# Patient Record
Sex: FEMALE | Race: WHITE | NOT HISPANIC OR LATINO | Employment: OTHER | URBAN - METROPOLITAN AREA
[De-identification: names, ages, dates, MRNs, and addresses within clinical notes are randomized per-mention and may not be internally consistent; named-entity substitution may affect disease eponyms.]

---

## 2017-06-22 ENCOUNTER — HOSPITAL ENCOUNTER (INPATIENT)
Facility: HOSPITAL | Age: 52
LOS: 1 days | Discharge: HOME | DRG: 872 | End: 2017-06-23
Attending: EMERGENCY MEDICINE | Admitting: INTERNAL MEDICINE
Payer: MEDICAID

## 2017-06-22 DIAGNOSIS — R79.89 ELEVATED TROPONIN: ICD-10-CM

## 2017-06-22 DIAGNOSIS — F19.10 SUBSTANCE ABUSE: ICD-10-CM

## 2017-06-22 DIAGNOSIS — D72.829 LEUKOCYTOSIS, UNSPECIFIED TYPE: ICD-10-CM

## 2017-06-22 DIAGNOSIS — N39.0 URINARY TRACT INFECTION WITHOUT HEMATURIA, SITE UNSPECIFIED: ICD-10-CM

## 2017-06-22 DIAGNOSIS — A41.9 SEPSIS, DUE TO UNSPECIFIED ORGANISM: Primary | ICD-10-CM

## 2017-06-22 DIAGNOSIS — E86.0 DEHYDRATION: ICD-10-CM

## 2017-06-22 DIAGNOSIS — Z72.0 TOBACCO ABUSE: ICD-10-CM

## 2017-06-22 PROBLEM — E86.1 INTRAVASCULAR VOLUME DEPLETION: Status: ACTIVE | Noted: 2017-06-22

## 2017-06-22 PROBLEM — J45.909 ASTHMA: Chronic | Status: ACTIVE | Noted: 2017-06-22

## 2017-06-22 PROBLEM — R65.10 SIRS (SYSTEMIC INFLAMMATORY RESPONSE SYNDROME): Status: ACTIVE | Noted: 2017-06-22

## 2017-06-22 PROBLEM — I95.9 HYPOTENSION: Status: ACTIVE | Noted: 2017-06-22

## 2017-06-22 PROBLEM — B19.20 HEPATITIS C: Status: ACTIVE | Noted: 2017-06-22

## 2017-06-22 PROBLEM — D64.9 NORMOCYTIC ANEMIA: Status: ACTIVE | Noted: 2017-06-22

## 2017-06-22 PROBLEM — E89.0 POSTOPERATIVE HYPOTHYROIDISM: Chronic | Status: ACTIVE | Noted: 2017-06-22

## 2017-06-22 LAB
ALBUMIN SERPL BCP-MCNC: 2.4 G/DL
ALP SERPL-CCNC: 63 U/L
ALT SERPL W/O P-5'-P-CCNC: 17 U/L
AMPHET+METHAMPHET UR QL: NEGATIVE
ANION GAP SERPL CALC-SCNC: 12 MMOL/L
AST SERPL-CCNC: 18 U/L
BARBITURATES UR QL SCN>200 NG/ML: NEGATIVE
BASOPHILS # BLD AUTO: 0.01 K/UL
BASOPHILS NFR BLD: 0 %
BENZODIAZ UR QL SCN>200 NG/ML: NORMAL
BILIRUB SERPL-MCNC: 0.5 MG/DL
BILIRUB UR QL STRIP: NEGATIVE
BNP SERPL-MCNC: 58 PG/ML
BUN SERPL-MCNC: 14 MG/DL
BZE UR QL SCN: NEGATIVE
CALCIUM SERPL-MCNC: 7.6 MG/DL
CANNABINOIDS UR QL SCN: NORMAL
CHLORIDE SERPL-SCNC: 98 MMOL/L
CK SERPL-CCNC: 27 U/L
CLARITY UR: CLEAR
CO2 SERPL-SCNC: 27 MMOL/L
COLOR UR: YELLOW
CREAT SERPL-MCNC: 1.4 MG/DL
CREAT UR-MCNC: 22.6 MG/DL
DIFFERENTIAL METHOD: ABNORMAL
EOSINOPHIL # BLD AUTO: 0.1 K/UL
EOSINOPHIL NFR BLD: 0.3 %
ERYTHROCYTE [DISTWIDTH] IN BLOOD BY AUTOMATED COUNT: 14.7 %
EST. GFR  (AFRICAN AMERICAN): 49 ML/MIN/1.73 M^2
EST. GFR  (NON AFRICAN AMERICAN): 43 ML/MIN/1.73 M^2
FLUAV AG SPEC QL IA: NEGATIVE
FLUBV AG SPEC QL IA: NEGATIVE
GLUCOSE SERPL-MCNC: 95 MG/DL
GLUCOSE UR QL STRIP: NEGATIVE
HCT VFR BLD AUTO: 30.8 %
HGB BLD-MCNC: 10.6 G/DL
HGB UR QL STRIP: ABNORMAL
KETONES UR QL STRIP: NEGATIVE
LACTATE SERPL-SCNC: 0.8 MMOL/L
LEUKOCYTE ESTERASE UR QL STRIP: ABNORMAL
LIPASE SERPL-CCNC: <3 U/L
LYMPHOCYTES # BLD AUTO: 1.4 K/UL
LYMPHOCYTES NFR BLD: 6.8 %
MCH RBC QN AUTO: 30 PG
MCHC RBC AUTO-ENTMCNC: 34.4 %
MCV RBC AUTO: 87 FL
METHADONE UR QL SCN>300 NG/ML: NEGATIVE
MICROSCOPIC COMMENT: ABNORMAL
MONOCYTES # BLD AUTO: 1.1 K/UL
MONOCYTES NFR BLD: 5.2 %
NEUTROPHILS # BLD AUTO: 18.4 K/UL
NEUTROPHILS NFR BLD: 87.7 %
NITRITE UR QL STRIP: NEGATIVE
OPIATES UR QL SCN: NORMAL
PCP UR QL SCN>25 NG/ML: NEGATIVE
PH UR STRIP: 8 [PH] (ref 5–8)
PLATELET # BLD AUTO: 314 K/UL
PMV BLD AUTO: 8.8 FL
POTASSIUM SERPL-SCNC: 3.9 MMOL/L
PROT SERPL-MCNC: 6.3 G/DL
PROT UR QL STRIP: NEGATIVE
RBC # BLD AUTO: 3.53 M/UL
RBC #/AREA URNS HPF: 1 /HPF (ref 0–4)
SODIUM SERPL-SCNC: 137 MMOL/L
SP GR UR STRIP: 1.01 (ref 1–1.03)
SPECIMEN SOURCE: NORMAL
SQUAMOUS #/AREA URNS HPF: 1 /HPF
TOXICOLOGY INFORMATION: NORMAL
TROPONIN I SERPL DL<=0.01 NG/ML-MCNC: 0.03 NG/ML
TSH SERPL DL<=0.005 MIU/L-ACNC: 2.61 UIU/ML
URN SPEC COLLECT METH UR: ABNORMAL
UROBILINOGEN UR STRIP-ACNC: NEGATIVE EU/DL
VALPROATE SERPL-MCNC: <12.5 UG/ML
WBC # BLD AUTO: 21.04 K/UL
WBC #/AREA URNS HPF: 12 /HPF (ref 0–5)
WBC CLUMPS URNS QL MICRO: ABNORMAL

## 2017-06-22 PROCEDURE — 21400001 HC TELEMETRY ROOM

## 2017-06-22 PROCEDURE — 93010 ELECTROCARDIOGRAM REPORT: CPT | Mod: ,,, | Performed by: INTERNAL MEDICINE

## 2017-06-22 PROCEDURE — 81000 URINALYSIS NONAUTO W/SCOPE: CPT

## 2017-06-22 PROCEDURE — 87088 URINE BACTERIA CULTURE: CPT

## 2017-06-22 PROCEDURE — 84443 ASSAY THYROID STIM HORMONE: CPT

## 2017-06-22 PROCEDURE — 99285 EMERGENCY DEPT VISIT HI MDM: CPT | Mod: 25

## 2017-06-22 PROCEDURE — 96365 THER/PROPH/DIAG IV INF INIT: CPT

## 2017-06-22 PROCEDURE — 87077 CULTURE AEROBIC IDENTIFY: CPT | Mod: 59

## 2017-06-22 PROCEDURE — 82550 ASSAY OF CK (CPK): CPT

## 2017-06-22 PROCEDURE — 84484 ASSAY OF TROPONIN QUANT: CPT

## 2017-06-22 PROCEDURE — 80053 COMPREHEN METABOLIC PANEL: CPT

## 2017-06-22 PROCEDURE — 83880 ASSAY OF NATRIURETIC PEPTIDE: CPT

## 2017-06-22 PROCEDURE — 87040 BLOOD CULTURE FOR BACTERIA: CPT | Mod: 59

## 2017-06-22 PROCEDURE — 63600175 PHARM REV CODE 636 W HCPCS: Performed by: EMERGENCY MEDICINE

## 2017-06-22 PROCEDURE — 63600175 PHARM REV CODE 636 W HCPCS: Performed by: NURSE PRACTITIONER

## 2017-06-22 PROCEDURE — 25000003 PHARM REV CODE 250: Performed by: EMERGENCY MEDICINE

## 2017-06-22 PROCEDURE — 87186 SC STD MICRODIL/AGAR DIL: CPT

## 2017-06-22 PROCEDURE — 85025 COMPLETE CBC W/AUTO DIFF WBC: CPT

## 2017-06-22 PROCEDURE — 80307 DRUG TEST PRSMV CHEM ANLYZR: CPT

## 2017-06-22 PROCEDURE — 87400 INFLUENZA A/B EACH AG IA: CPT | Mod: 59

## 2017-06-22 PROCEDURE — 83690 ASSAY OF LIPASE: CPT

## 2017-06-22 PROCEDURE — 83605 ASSAY OF LACTIC ACID: CPT

## 2017-06-22 PROCEDURE — 94640 AIRWAY INHALATION TREATMENT: CPT

## 2017-06-22 PROCEDURE — 96361 HYDRATE IV INFUSION ADD-ON: CPT

## 2017-06-22 PROCEDURE — 80164 ASSAY DIPROPYLACETIC ACD TOT: CPT

## 2017-06-22 PROCEDURE — 36415 COLL VENOUS BLD VENIPUNCTURE: CPT

## 2017-06-22 PROCEDURE — 87086 URINE CULTURE/COLONY COUNT: CPT

## 2017-06-22 PROCEDURE — 25000242 PHARM REV CODE 250 ALT 637 W/ HCPCS: Performed by: NURSE PRACTITIONER

## 2017-06-22 PROCEDURE — 93005 ELECTROCARDIOGRAM TRACING: CPT

## 2017-06-22 PROCEDURE — 25000003 PHARM REV CODE 250: Performed by: NURSE PRACTITIONER

## 2017-06-22 PROCEDURE — 63600175 PHARM REV CODE 636 W HCPCS: Performed by: INTERNAL MEDICINE

## 2017-06-22 PROCEDURE — 25000003 PHARM REV CODE 250: Performed by: INTERNAL MEDICINE

## 2017-06-22 PROCEDURE — 11000001 HC ACUTE MED/SURG PRIVATE ROOM

## 2017-06-22 RX ORDER — CALCIUM CARBONATE 600 MG
600 TABLET ORAL 2 TIMES DAILY WITH MEALS
COMMUNITY

## 2017-06-22 RX ORDER — DIAZEPAM 10 MG/1
10 TABLET ORAL EVERY 12 HOURS PRN
COMMUNITY
Start: 2014-04-04

## 2017-06-22 RX ORDER — CYCLOBENZAPRINE HCL 10 MG
10 TABLET ORAL 3 TIMES DAILY PRN
COMMUNITY

## 2017-06-22 RX ORDER — LEVOTHYROXINE SODIUM 112 UG/1
112 TABLET ORAL
Status: DISCONTINUED | OUTPATIENT
Start: 2017-06-23 | End: 2017-06-23 | Stop reason: HOSPADM

## 2017-06-22 RX ORDER — ALBUTEROL SULFATE 2.5 MG/.5ML
2.5 SOLUTION RESPIRATORY (INHALATION) EVERY 4 HOURS PRN
Status: DISCONTINUED | OUTPATIENT
Start: 2017-06-22 | End: 2017-06-23 | Stop reason: HOSPADM

## 2017-06-22 RX ORDER — FLUTICASONE FUROATE AND VILANTEROL 100; 25 UG/1; UG/1
1 POWDER RESPIRATORY (INHALATION) DAILY
COMMUNITY

## 2017-06-22 RX ORDER — DIVALPROEX SODIUM 500 MG/1
TABLET, DELAYED RELEASE ORAL
COMMUNITY
Start: 2014-10-07

## 2017-06-22 RX ORDER — CALCIUM CARBONATE 500(1250)
500 TABLET ORAL 2 TIMES DAILY WITH MEALS
Status: DISCONTINUED | OUTPATIENT
Start: 2017-06-22 | End: 2017-06-23 | Stop reason: HOSPADM

## 2017-06-22 RX ORDER — FAMOTIDINE 20 MG/1
20 TABLET, FILM COATED ORAL DAILY
Status: DISCONTINUED | OUTPATIENT
Start: 2017-06-22 | End: 2017-06-23 | Stop reason: HOSPADM

## 2017-06-22 RX ORDER — OMEPRAZOLE 40 MG/1
40 CAPSULE, DELAYED RELEASE ORAL DAILY
COMMUNITY

## 2017-06-22 RX ORDER — SODIUM CHLORIDE 9 MG/ML
INJECTION, SOLUTION INTRAVENOUS CONTINUOUS
Status: DISCONTINUED | OUTPATIENT
Start: 2017-06-22 | End: 2017-06-23 | Stop reason: HOSPADM

## 2017-06-22 RX ORDER — DIVALPROEX SODIUM 500 MG/1
500 TABLET, DELAYED RELEASE ORAL 2 TIMES DAILY
Status: DISCONTINUED | OUTPATIENT
Start: 2017-06-22 | End: 2017-06-23 | Stop reason: HOSPADM

## 2017-06-22 RX ORDER — ENOXAPARIN SODIUM 100 MG/ML
40 INJECTION SUBCUTANEOUS EVERY 24 HOURS
Status: DISCONTINUED | OUTPATIENT
Start: 2017-06-22 | End: 2017-06-23 | Stop reason: HOSPADM

## 2017-06-22 RX ORDER — LISINOPRIL 10 MG/1
10 TABLET ORAL DAILY
COMMUNITY

## 2017-06-22 RX ORDER — MELOXICAM 15 MG/1
15 TABLET ORAL DAILY
COMMUNITY

## 2017-06-22 RX ORDER — LEVOTHYROXINE SODIUM 112 UG/1
TABLET ORAL
COMMUNITY
Start: 2014-04-04

## 2017-06-22 RX ORDER — ACETAMINOPHEN 325 MG/1
650 TABLET ORAL EVERY 8 HOURS PRN
Status: DISCONTINUED | OUTPATIENT
Start: 2017-06-22 | End: 2017-06-23

## 2017-06-22 RX ORDER — MULTIVITAMIN
1 TABLET ORAL
COMMUNITY

## 2017-06-22 RX ORDER — FLUTICASONE PROPIONATE 50 MCG
1 SPRAY, SUSPENSION (ML) NASAL DAILY
COMMUNITY

## 2017-06-22 RX ORDER — SUMATRIPTAN SUCCINATE 100 MG/1
100 TABLET ORAL
COMMUNITY
End: 2021-04-15

## 2017-06-22 RX ORDER — IPRATROPIUM BROMIDE AND ALBUTEROL SULFATE 2.5; .5 MG/3ML; MG/3ML
3 SOLUTION RESPIRATORY (INHALATION) EVERY 6 HOURS
Status: DISCONTINUED | OUTPATIENT
Start: 2017-06-22 | End: 2017-06-23 | Stop reason: HOSPADM

## 2017-06-22 RX ORDER — FERROUS SULFATE, DRIED 160(50) MG
1 TABLET, EXTENDED RELEASE ORAL
COMMUNITY
Start: 2013-02-19

## 2017-06-22 RX ORDER — BUDESONIDE 0.5 MG/2ML
0.5 INHALANT ORAL EVERY 12 HOURS
Status: DISCONTINUED | OUTPATIENT
Start: 2017-06-22 | End: 2017-06-23 | Stop reason: HOSPADM

## 2017-06-22 RX ORDER — ALBUTEROL SULFATE 90 UG/1
1 AEROSOL, METERED RESPIRATORY (INHALATION) EVERY 4 HOURS PRN
Status: DISCONTINUED | OUTPATIENT
Start: 2017-06-22 | End: 2017-06-22

## 2017-06-22 RX ORDER — ARFORMOTEROL TARTRATE 15 UG/2ML
15 SOLUTION RESPIRATORY (INHALATION) 2 TIMES DAILY
Status: DISCONTINUED | OUTPATIENT
Start: 2017-06-22 | End: 2017-06-23 | Stop reason: HOSPADM

## 2017-06-22 RX ORDER — MAGNESIUM 250 MG
TABLET ORAL DAILY
COMMUNITY

## 2017-06-22 RX ORDER — FLUTICASONE PROPIONATE AND SALMETEROL 100; 50 UG/1; UG/1
1 POWDER RESPIRATORY (INHALATION) 2 TIMES DAILY
Status: DISCONTINUED | OUTPATIENT
Start: 2017-06-22 | End: 2017-06-22 | Stop reason: CLARIF

## 2017-06-22 RX ADMIN — ARFORMOTEROL TARTRATE 15 MCG: 15 SOLUTION RESPIRATORY (INHALATION) at 07:06

## 2017-06-22 RX ADMIN — DIVALPROEX SODIUM 500 MG: 500 TABLET, DELAYED RELEASE ORAL at 10:06

## 2017-06-22 RX ADMIN — SODIUM CHLORIDE: 0.9 INJECTION, SOLUTION INTRAVENOUS at 02:06

## 2017-06-22 RX ADMIN — ACETAMINOPHEN 650 MG: 325 TABLET ORAL at 10:06

## 2017-06-22 RX ADMIN — SODIUM CHLORIDE 2000 ML: 0.9 INJECTION, SOLUTION INTRAVENOUS at 11:06

## 2017-06-22 RX ADMIN — BUDESONIDE 0.5 MG: 0.5 SUSPENSION RESPIRATORY (INHALATION) at 07:06

## 2017-06-22 RX ADMIN — CEFTRIAXONE 1 G: 1 INJECTION, SOLUTION INTRAVENOUS at 01:06

## 2017-06-22 RX ADMIN — CALCIUM 500 MG: 500 TABLET ORAL at 06:06

## 2017-06-22 RX ADMIN — IPRATROPIUM BROMIDE AND ALBUTEROL SULFATE 3 ML: .5; 3 SOLUTION RESPIRATORY (INHALATION) at 07:06

## 2017-06-22 RX ADMIN — ENOXAPARIN SODIUM 40 MG: 100 INJECTION SUBCUTANEOUS at 06:06

## 2017-06-22 RX ADMIN — FAMOTIDINE 20 MG: 20 TABLET ORAL at 06:06

## 2017-06-22 NOTE — ASSESSMENT & PLAN NOTE
WBC 21, GFR 43, SBP < 90.  Recent steroid use likely contributing to elevated WBC count.  Blood and urine cultures pending.  Will start Rocephin for now and follow cultures.

## 2017-06-22 NOTE — ASSESSMENT & PLAN NOTE
BP responded well with IV bolus in ED.  Will hold home antihypertensives for now.  Continuous IV fluids.

## 2017-06-22 NOTE — PLAN OF CARE
Problem: Patient Care Overview  Goal: Plan of Care Review  AAO, ambulatory.  Free from falls.  Reviewed plan of care with patient.  Patient verbalized understanding and teachback.      12h chart check completed.

## 2017-06-22 NOTE — ED PROVIDER NOTES
"SCRIBE #1 NOTE: I, Rosalio Juan, am scribing for, and in the presence of, Jose Francisco Harrison MD. I have scribed the entire note.     SCRIBE #2 NOTE: I, Kaur Avery MD and am scribing for, and in the presence of, Amado Juan.     History      Chief Complaint   Patient presents with    Generalized Body Aches     Pt states, "I have body aches for about 2 weeks and get really tired feeling and having fever."       Review of patient's allergies indicates:   Allergen Reactions    Benadryl [diphenhydramine hcl]     Penicillins         HPI   HPI    6/22/2017, 10:51 AM   History obtained from the patient and       History of Present Illness: Donna G Severio is a 51 y.o. female patient who presents to the Emergency Department for body aches which onset gradually 2 weeks ago. Sxs are constant and moderate in severity. Pt reports being Dx with flu at walk in clinic 5 days ago and was Rx cough medicine.  There are no mitigating or exacerbating factors noted. Associated sxs include episodic fatigue,  fever (Tmax 104).  Pt denies any HA, N/V/D, numbness, dizziness, LOC, CP, SOB, Abd pain, dysuria,  and all other sxs at this time. No further complaints or concerns at this time.     Arrival mode: Personal vehicle      PCP: Chuy Kirkland Jr, MD       Past Medical History:  Past Medical History:   Diagnosis Date    Asthma     Back pain     Hypertension     Thyroid disease     hyperthyroid       Past Surgical History:  Past Surgical History:   Procedure Laterality Date    HYSTERECTOMY      THYROIDECTOMY         Family History:  Family history reviewed not relevant      Social History:  Social History    Social History     Social History    Marital status: Single     Spouse name: N/A    Number of children: N/A    Years of education: N/A     Occupational History    Not on file.     Social History Main Topics    Smoking status: Current Every Day Smoker     Packs/day: 0.50     Years: 35.00     " Types: Cigarettes    Smokeless tobacco: Not on file    Alcohol use No    Drug use:      Types: Marijuana, Benzodiazepines    Sexual activity: Not on file     Other Topics Concern    Not on file     Social History Narrative    No narrative on file             ROS   Review of Systems   Constitutional: Positive for fatigue and fever.   HENT: Negative for sore throat.    Respiratory: Negative for shortness of breath.    Cardiovascular: Negative for chest pain.   Gastrointestinal: Negative for abdominal pain, constipation, diarrhea, nausea and vomiting.   Genitourinary: Negative for dysuria.   Musculoskeletal: Positive for arthralgias. Negative for back pain.   Skin: Negative for rash.   Neurological: Negative for syncope, weakness and headaches.   Hematological: Does not bruise/bleed easily.       Physical Exam      Initial Vitals [06/22/17 1044]   BP Pulse Resp Temp SpO2   (!) 88/54 (!) 112 20 99 °F (37.2 °C) 96 %      MAP       65.33          Physical Exam  Nursing Notes and Vital Signs Reviewed.  Constitutional: Patient is in no acute distress. Well-developed and well-nourished.  Head: Atraumatic. Normocephalic.  Eyes: PERRL. EOM intact. Conjunctivae are not pale. No scleral icterus.  ENT: Mucous membranes are moist. Oropharynx is clear and symmetric.    Neck: Supple. Full ROM. No lymphadenopathy.  Cardiovascular: Regular rate. Regular rhythm. No murmurs, rubs, or gallops. Distal pulses are 2+ and symmetric.  Pulmonary/Chest: No respiratory distress. Clear to auscultation bilaterally. No wheezing, rales, or rhonchi.  Abdominal: Soft and non-distended.  There is no tenderness.  No rebound, guarding, or rigidity. Good bowel sounds.  Genitourinary: No CVA tenderness  Musculoskeletal: Moves all extremities. No obvious deformities. No edema. No calf tenderness.  Skin: Warm and dry.  Neurological:  Alert, awake, and appropriate.  Normal speech.  No acute focal neurological deficits are appreciated.  Psychiatric:  "Normal affect. Good eye contact. Appropriate in content.    ED Course    Procedures  ED Vital Signs:  Vitals:    06/22/17 1044 06/22/17 1105 06/22/17 1107 06/22/17 1108   BP: (!) 88/54 (!) 85/53     Pulse: (!) 112 94 94 91   Resp: 20 (!) 21 17 15   Temp: 99 °F (37.2 °C)      TempSrc: Oral      SpO2: 96% 99% 98% 98%   Weight: 45.8 kg (101 lb)      Height: 5' 1.5" (1.562 m)       06/22/17 1119 06/22/17 1147 06/22/17 1217 06/22/17 1412   BP: (!) 101/58 (!) 98/59 110/64    Pulse: 85 73 75    Resp: (!) 21 17 16    Temp:    98.2 °F (36.8 °C)   TempSrc:    Oral   SpO2: 99% 100% 100%    Weight:       Height:        06/22/17 1438   BP: 119/70   Pulse: 65   Resp: 16   Temp:    TempSrc: Oral   SpO2: 100%   Weight: 45.8 kg (101 lb)   Height: 5' 1" (1.549 m)       Abnormal Lab Results:  Labs Reviewed   CBC W/ AUTO DIFFERENTIAL - Abnormal; Notable for the following:        Result Value    WBC 21.04 (*)     RBC 3.53 (*)     Hemoglobin 10.6 (*)     Hematocrit 30.8 (*)     RDW 14.7 (*)     MPV 8.8 (*)     Gran # 18.4 (*)     Mono # 1.1 (*)     Gran% 87.7 (*)     Lymph% 6.8 (*)     All other components within normal limits   COMPREHENSIVE METABOLIC PANEL - Abnormal; Notable for the following:     Calcium 7.6 (*)     Albumin 2.4 (*)     eGFR if  49 (*)     eGFR if non  43 (*)     All other components within normal limits   URINALYSIS - Abnormal; Notable for the following:     Occult Blood UA Trace (*)     Leukocytes, UA 1+ (*)     All other components within normal limits   TROPONIN I - Abnormal; Notable for the following:     Troponin I 0.027 (*)     All other components within normal limits   URINALYSIS MICROSCOPIC - Abnormal; Notable for the following:     WBC, UA 12 (*)     All other components within normal limits   VALPROIC ACID - Abnormal; Notable for the following:     Valproic Acid Lvl <12.5 (*)     All other components within normal limits   LIPASE - Abnormal; Notable for the following:     " Lipase <3 (*)     All other components within normal limits   CULTURE, URINE   CULTURE, BLOOD   CULTURE, BLOOD   CULTURE, URINE   INFLUENZA A AND B ANTIGEN   B-TYPE NATRIURETIC PEPTIDE   CK   LACTIC ACID, PLASMA   DRUG SCREEN PANEL, URINE EMERGENCY   DRUG SCREEN PANEL, URINE EMERGENCY   VALPROIC ACID   TSH   TSH   LIPASE        All Lab Results:  Results for orders placed or performed during the hospital encounter of 06/22/17   CBC auto differential   Result Value Ref Range    WBC 21.04 (H) 3.90 - 12.70 K/uL    RBC 3.53 (L) 4.00 - 5.40 M/uL    Hemoglobin 10.6 (L) 12.0 - 16.0 g/dL    Hematocrit 30.8 (L) 37.0 - 48.5 %    MCV 87 82 - 98 fL    MCH 30.0 27.0 - 31.0 pg    MCHC 34.4 32.0 - 36.0 %    RDW 14.7 (H) 11.5 - 14.5 %    Platelets 314 150 - 350 K/uL    MPV 8.8 (L) 9.2 - 12.9 fL    Gran # 18.4 (H) 1.8 - 7.7 K/uL    Lymph # 1.4 1.0 - 4.8 K/uL    Mono # 1.1 (H) 0.3 - 1.0 K/uL    Eos # 0.1 0.0 - 0.5 K/uL    Baso # 0.01 0.00 - 0.20 K/uL    Gran% 87.7 (H) 38.0 - 73.0 %    Lymph% 6.8 (L) 18.0 - 48.0 %    Mono% 5.2 4.0 - 15.0 %    Eosinophil% 0.3 0.0 - 8.0 %    Basophil% 0.0 0.0 - 1.9 %    Differential Method Automated    Comprehensive metabolic panel   Result Value Ref Range    Sodium 137 136 - 145 mmol/L    Potassium 3.9 3.5 - 5.1 mmol/L    Chloride 98 95 - 110 mmol/L    CO2 27 23 - 29 mmol/L    Glucose 95 70 - 110 mg/dL    BUN, Bld 14 6 - 20 mg/dL    Creatinine 1.4 0.5 - 1.4 mg/dL    Calcium 7.6 (L) 8.7 - 10.5 mg/dL    Total Protein 6.3 6.0 - 8.4 g/dL    Albumin 2.4 (L) 3.5 - 5.2 g/dL    Total Bilirubin 0.5 0.1 - 1.0 mg/dL    Alkaline Phosphatase 63 55 - 135 U/L    AST 18 10 - 40 U/L    ALT 17 10 - 44 U/L    Anion Gap 12 8 - 16 mmol/L    eGFR if African American 49 (A) >60 mL/min/1.73 m^2    eGFR if non African American 43 (A) >60 mL/min/1.73 m^2   Urinalysis   Result Value Ref Range    Specimen UA Urine, Clean Catch     Color, UA Yellow Yellow, Straw, Kate    Appearance, UA Clear Clear    pH, UA 8.0 5.0 - 8.0     Specific Gravity, UA 1.010 1.005 - 1.030    Protein, UA Negative Negative    Glucose, UA Negative Negative    Ketones, UA Negative Negative    Bilirubin (UA) Negative Negative    Occult Blood UA Trace (A) Negative    Nitrite, UA Negative Negative    Urobilinogen, UA Negative <2.0 EU/dL    Leukocytes, UA 1+ (A) Negative   Influenza antigen Nasopharyngeal Swab   Result Value Ref Range    Influenza A Ag, EIA Negative Negative    Influenza B Ag, EIA Negative Negative    Flu A & B Source Nasopharyngeal Swab    Brain natriuretic peptide   Result Value Ref Range    BNP 58 0 - 99 pg/mL   CK   Result Value Ref Range    CPK 27 20 - 180 U/L   Troponin I   Result Value Ref Range    Troponin I 0.027 (H) 0.000 - 0.026 ng/mL   Urinalysis Microscopic   Result Value Ref Range    RBC, UA 1 0 - 4 /hpf    WBC, UA 12 (H) 0 - 5 /hpf    WBC Clumps, UA Rare None-Rare    Squam Epithel, UA 1 /hpf    Microscopic Comment SEE COMMENT    Lactic acid, plasma   Result Value Ref Range    Lactate (Lactic Acid) 0.8 0.5 - 2.2 mmol/L   Drug screen panel, emergency   Result Value Ref Range    Benzodiazepines Presumptive Positive     Methadone metabolites Negative     Cocaine (Metab.) Negative     Opiate Scrn, Ur Presumptive Positive     Barbiturate Screen, Ur Negative     Amphetamine Screen, Ur Negative     THC Presumptive Positive     Phencyclidine Negative     Creatinine, Random Ur 22.6 15.0 - 325.0 mg/dL    Toxicology Information SEE COMMENT    TSH   Result Value Ref Range    TSH 2.610 0.400 - 4.000 uIU/mL   Valproic Acid   Result Value Ref Range    Valproic Acid Lvl <12.5 (L) 50.0 - 100.0 ug/mL   Lipase   Result Value Ref Range    Lipase <3 (L) 4 - 60 U/L         Imaging Results:  Imaging Results          US Abdomen Limited (Final result)  Result time 06/22/17 13:18:51    Final result by Valerio Saravia III, MD (06/22/17 13:18:51)                 Impression:          1.  Mild dilation proximal common bile duct without obstructing lesion  identified.  2.  Mildly complex 1.7 cm right renal cyst.  Otherwise unremarkable exam.         Electronically signed by: JOSE D SARAVIA MD  Date:     06/22/17  Time:    13:18              Narrative:    US ABDOMEN LIMITED    History: Right upper quadrant pain.    Findings: The liver measures 12.9 cm in length. The liver is normal in appearance without focal hepatic mass identified. The gallbladder is normal in appearance without evidence of cholelithiasis or acute cholecystitis. Sonographic Flores's sign is negative.  There is mild atrophy of the proximal common bile duct measuring up to 8.5 mm in diameter.  There is normal gradual tapering of the more distal common duct without obstructing calculus identified.  There is no intrahepatic biliary ductal dilation.  The very distal periampullary CBD is obscured by bowel gas.  There is a 1.7 cm mildly complex cyst in the mid right kidney with mild wall thickening or internal debris.  The right kidney is otherwise unremarkable. The visualized portions of the pancreas are normal.  The visualized aspects of the aorta and IVC are normal in caliber.  Color and spectral Doppler evaluation demonstrated normal antegrade flow and normal waveforms in the portal vein. No ascites identified.                             X-Ray Chest PA And Lateral (Final result)  Result time 06/22/17 11:33:46    Final result by Jose D Saravia III, MD (06/22/17 11:33:46)                 Impression:     No acute disease identified in the chest.      Electronically signed by: JOSE D SARAVIA MD  Date:     06/22/17  Time:    11:33              Narrative:    XR CHEST PA AND LATERAL    Clinical history: fever    Findings: The lungs appear clear of active disease. There is no evidence of pneumonia, mass, effusion, pneumothorax or other acute pulmonary disease.  Cardiomediastinal silhouette is within normal limits.  No acute osseous abnormality detected.                                    The EKG was ordered,  reviewed, and independently interpreted by the ED provider.  Interpretation time: 11:09  Rate: 90 BPM  Rhythm: normal sinus rhythm  Interpretation: No acute ST changes. No STEMI.      The Emergency Provider reviewed the vital signs and test results, which are outlined above.    ED Discussion     11:19 AM: Dr. Harrison transfers care of pt to Dr. Avery, pending lab results.    12:17 PM: Dr. Avery re-evaluated pt. Pt complaining of abd cramping and reports that she recently ran out of her pain medicine which she takes for chronic back pain. Repeat ABD exam results tenderness over gallbladder. Negative US, will obtain an US.  D/w pt all pertinent results. Answered all pt's questions. Pt expresses understanding at this time.     1:45 PM: Discussed case with Natalie Kapoor NP (Hospital Medicine). Natalie agrees with current care and management of pt and accepts admission.  Wants Rocephin to tx mild UTI.   Admitting Service: Hospital medicine   Admitting Physician: Dr. Jimenez  Admit to: Telemetry      ED Medication(s):  Medications   cefTRIAXone (ROCEPHIN) 1 g in dextrose 5 % 50 mL IVPB (1 g Intravenous New Bag 6/22/17 1353)   0.9%  NaCl infusion ( Intravenous New Bag 6/22/17 1401)   acetaminophen tablet 650 mg (not administered)   calcium carbonate tablet 500 mg (not administered)   divalproex EC tablet 500 mg (not administered)   levothyroxine tablet 112 mcg (not administered)   albuterol-ipratropium 2.5mg-0.5mg/3mL nebulizer solution 3 mL (not administered)   enoxaparin injection 40 mg (not administered)   famotidine tablet 20 mg (not administered)   albuterol sulfate nebulizer solution 2.5 mg (not administered)   budesonide nebulizer solution 0.5 mg (not administered)   arformoterol nebulizer solution 15 mcg (not administered)   sodium chloride 0.9% bolus 2,000 mL (0 mLs Intravenous Stopped 6/22/17 1413)       Current Discharge Medication List                Medical Decision Making    Medical Decision Making:    Clinical Tests:   Lab Tests: Reviewed and Ordered  Radiological Study: Reviewed and Ordered  Medical Tests: Reviewed and Ordered           Scribe Attestation:   Scribe #1: I performed the above scribed service and the documentation accurately describes the services I performed. I attest to the accuracy of the note.    Attending:   Physician Attestation Statement for Scribe #1: I, Jose Francisco Harrison MD, personally performed the services described in this documentation, as scribed by Rosalio Juan, in my presence, and it is both accurate and complete.         Attending Attestation:           Physician Attestation for Scribe:    Physician Attestation Statement for Scribe #2: I, Kaur Avery MD, reviewed documentation, as scribed by Amado Juan in my presence, and it is both accurate and complete. I also acknowledge and confirm the content of the note done by Scribe #1.          Clinical Impression       ICD-10-CM ICD-9-CM   1. Sepsis, due to unspecified organism A41.9 038.9     995.91   2. Urinary tract infection without hematuria, site unspecified N39.0 599.0   3. Dehydration E86.0 276.51   4. Leukocytosis, unspecified type D72.829 288.60   5. Tobacco abuse Z72.0 305.1   6. Substance abuse F19.10 305.90   7. Elevated troponin R74.8 790.6       Disposition:   Disposition: Admitted  Condition: Fair         Kaur Avery MD  06/22/17 1535

## 2017-06-22 NOTE — H&P
"[facility] Hospital Medicine  History & Physical    Patient Name: Donna G Severio  MRN: [id number]  Admission Date: 6/22/2017  Attending Physician: Wiliam Jimenez MD   Primary Care Provider: Chuy Kirkland Jr, MD         Patient information was obtained from patient and ER records.     Subjective:     Principal Problem:SIRS (systemic inflammatory response syndrome)    Chief Complaint:   Chief Complaint   Patient presents with    Generalized Body Aches     Pt states, "I have body aches for about 2 weeks and get really tired feeling and having fever."        HPI: Ms. Severio is a 50yo female with a PMHx of hypertension, Grave's disease s/p thyroidectomy, asthma, and chronic back pain.  She presents to the ED with c/o intermittent fever (T-Max 104) x 2 weeks.  Temperature this AM of 102.  Associated arthralgias, chills, decreased appetite, congestion, nonproductive cough, dizziness, HA, nausea, vomiting x 1 day, and diarrhea x 1 day.  Denies any rashes or wounds, abdominal pain or distention, melena, dysuria, frequency, urgency, or hematuria.  She was seen in Urgent Care 2 weeks ago, and was diagnosed with "the flu", however, was not tested for influenza.  She was given cough syrup and an unknown pill that patient thinks was either an antibiotic or steroid, was not Tamiflu.  Labs in ED resulted WBC 21, UA consistent with UTI, UDS positive for opiates, benzos, and THC.  Patient noted to be hypotensive on arrival to ED with BP 85/53, which responded to IV bolus (110/64).  Patient has remained afebrile since arrival to ED.    Past Medical History:   Diagnosis Date    Asthma     Back pain     Hypertension     Thyroid disease     hyperthyroid       Past Surgical History:   Procedure Laterality Date    HYSTERECTOMY      THYROIDECTOMY         Review of patient's allergies indicates:   Allergen Reactions    Benadryl [diphenhydramine hcl]     Penicillins Nausea And Vomiting       No current " facility-administered medications on file prior to encounter.      Current Outpatient Prescriptions on File Prior to Encounter   Medication Sig    fluocinonide 0.05% (LIDEX) 0.05 % cream Apply topically 2 (two) times daily.    hydrOXYzine HCl (ATARAX) 25 MG tablet Take 1 tablet (25 mg total) by mouth every 6 (six) hours.    ketorolac (TORADOL) 10 mg tablet Take 1 tablet (10 mg total) by mouth every 6 (six) hours.     Family History     Problem Relation (Age of Onset)    Heart attack Mother (57)    Stroke Father        Social History Main Topics    Smoking status: Current Every Day Smoker     Packs/day: 0.50     Years: 35.00     Types: Cigarettes    Smokeless tobacco: Not on file    Alcohol use No    Drug use:      Types: Marijuana, Benzodiazepines    Sexual activity: Not on file     Review of Systems   Constitutional: Positive for activity change, appetite change, fatigue and fever.   HENT: Positive for congestion and rhinorrhea. Negative for sore throat and trouble swallowing.    Respiratory: Positive for cough. Negative for apnea, chest tightness, shortness of breath and wheezing.    Cardiovascular: Negative for chest pain, palpitations and leg swelling.   Gastrointestinal: Negative for abdominal distention, abdominal pain, blood in stool, constipation, diarrhea, nausea and vomiting.   Endocrine: Negative for polydipsia, polyphagia and polyuria.   Genitourinary: Negative for difficulty urinating, dysuria, frequency, hematuria and urgency.   Musculoskeletal: Positive for arthralgias and back pain. Negative for gait problem, joint swelling and myalgias.   Skin: Negative for pallor, rash and wound.   Neurological: Positive for dizziness and headaches. Negative for seizures, syncope, speech difficulty, weakness, light-headedness and numbness.   Psychiatric/Behavioral: Negative for confusion. The patient is not nervous/anxious.    All other systems reviewed and are negative.    Objective:     Vital Signs (Most  Recent):  Temp: 98.2 °F (36.8 °C) (06/22/17 1412)  Pulse: 75 (06/22/17 1217)  Resp: 16 (06/22/17 1217)  BP: 110/64 (06/22/17 1217)  SpO2: 100 % (06/22/17 1217) Vital Signs (24h Range):  Temp:  [98.2 °F (36.8 °C)-99 °F (37.2 °C)] 98.2 °F (36.8 °C)  Pulse:  [] 75  Resp:  [15-21] 16  SpO2:  [96 %-100 %] 100 %  BP: ()/(53-64) 110/64     Weight: 45.8 kg (101 lb)  Body mass index is 18.77 kg/m².    Physical Exam   Constitutional: She is oriented to person, place, and time. She appears well-developed and well-nourished.   HENT:   Head: Normocephalic and atraumatic.   Eyes: Conjunctivae and EOM are normal. Pupils are equal, round, and reactive to light.   Neck: Neck supple. No JVD present. No thyromegaly present.   Cardiovascular: Normal rate, regular rhythm, normal heart sounds and intact distal pulses.  Exam reveals no gallop and no friction rub.    No murmur heard.  Pulmonary/Chest: Effort normal. No respiratory distress. She has no wheezes. She has no rales.   Diminished breath sounds.   Abdominal: Soft. Bowel sounds are normal. She exhibits no distension. There is no tenderness.   Musculoskeletal: Normal range of motion. She exhibits no edema, tenderness or deformity.   Neurological: She is alert and oriented to person, place, and time. She has normal reflexes. No cranial nerve deficit.   Skin: Skin is warm and dry. No rash noted. No erythema.   Psychiatric: She has a normal mood and affect. Judgment normal.   Nursing note and vitals reviewed.       Significant Labs:   BMP:   Recent Labs  Lab 06/22/17  1103   GLU 95      K 3.9   CL 98   CO2 27   BUN 14   CREATININE 1.4   CALCIUM 7.6*     CBC:   Recent Labs  Lab 06/22/17  1103   WBC 21.04*   HGB 10.6*   HCT 30.8*        TSH:   Recent Labs  Lab 06/22/17  1242   TSH 2.610     Urine Studies:   Recent Labs  Lab 06/22/17  1055   COLORU Yellow   APPEARANCEUA Clear   PHUR 8.0   SPECGRAV 1.010   PROTEINUA Negative   GLUCUA Negative   KETONESU Negative    BILIRUBINUA Negative   OCCULTUA Trace*   NITRITE Negative   UROBILINOGEN Negative   LEUKOCYTESUR 1+*   RBCUA 1   WBCUA 12*   SQUAMEPITHEL 1     All pertinent labs within the past 24 hours have been reviewed.    Significant Imaging:    Imaging Results          US Abdomen Limited (Final result)  Result time 06/22/17 13:18:51    Final result by Jose D Ferrell III, MD (06/22/17 13:18:51)                 Impression:          1.  Mild dilation proximal common bile duct without obstructing lesion identified.  2.  Mildly complex 1.7 cm right renal cyst.  Otherwise unremarkable exam.         Electronically signed by: JOSE D FERRELL MD  Date:     06/22/17  Time:    13:18              Narrative:    US ABDOMEN LIMITED    History: Right upper quadrant pain.    Findings: The liver measures 12.9 cm in length. The liver is normal in appearance without focal hepatic mass identified. The gallbladder is normal in appearance without evidence of cholelithiasis or acute cholecystitis. Sonographic Flores's sign is negative.  There is mild atrophy of the proximal common bile duct measuring up to 8.5 mm in diameter.  There is normal gradual tapering of the more distal common duct without obstructing calculus identified.  There is no intrahepatic biliary ductal dilation.  The very distal periampullary CBD is obscured by bowel gas.  There is a 1.7 cm mildly complex cyst in the mid right kidney with mild wall thickening or internal debris.  The right kidney is otherwise unremarkable. The visualized portions of the pancreas are normal.  The visualized aspects of the aorta and IVC are normal in caliber.  Color and spectral Doppler evaluation demonstrated normal antegrade flow and normal waveforms in the portal vein. No ascites identified.                             X-Ray Chest PA And Lateral (Final result)  Result time 06/22/17 11:33:46    Final result by Jose D Ferrell III, MD (06/22/17 11:33:46)                 Impression:     No acute  disease identified in the chest.      Electronically signed by: JOSE D FERRELL MD  Date:     06/22/17  Time:    11:33              Narrative:    XR CHEST PA AND LATERAL    Clinical history: fever    Findings: The lungs appear clear of active disease. There is no evidence of pneumonia, mass, effusion, pneumothorax or other acute pulmonary disease.  Cardiomediastinal silhouette is within normal limits.  No acute osseous abnormality detected.                            Assessment/Plan:     * SIRS (systemic inflammatory response syndrome)    WBC 21, GFR 43, SBP < 90.  Recent steroid use likely contributing to elevated WBC count.  Blood and urine cultures pending.  Will start Rocephin for now and follow cultures.          UTI (urinary tract infection)    Urine culture pending.  IV Rocephin.         Hypotension with h/o hypertension, likely secondary to IVVD    BP responded well with IV bolus in ED.  Will hold home antihypertensives for now.  Continuous IV fluids.          Polysubstance abuse    UDS positive for opiates, benzos, and THC.  Cessation counseling.          Normocytic anemia    H&H stable.  Repeat BMP in AM.          Tobacco abuse    Smoking cessation counseling.          Asthma    Will continue home asthma medications.          Grave's disease s/p thyroidectomy with postoperative hypothyroidism    Continue home Synthroid.            VTE Risk Mitigation         Ordered     enoxaparin injection 40 mg  Daily     Route:  Subcutaneous        06/22/17 1441     Medium Risk of VTE  Once      06/22/17 1344     Place sequential compression device  Until discontinued      06/22/17 1344        MARISOL Moseley  Department of Hospital Medicine   [facility]

## 2017-06-22 NOTE — ED NOTES
Ultrasound at bedside. Pt lying in bed. Denies any needs at this time. Will continue to monitor. Call light in reach.

## 2017-06-22 NOTE — SUBJECTIVE & OBJECTIVE
Past Medical History:   Diagnosis Date    Asthma     Back pain     Hypertension     Thyroid disease     hyperthyroid       Past Surgical History:   Procedure Laterality Date    HYSTERECTOMY      THYROIDECTOMY         Review of patient's allergies indicates:   Allergen Reactions    Benadryl [diphenhydramine hcl]     Penicillins Nausea And Vomiting       No current facility-administered medications on file prior to encounter.      Current Outpatient Prescriptions on File Prior to Encounter   Medication Sig    fluocinonide 0.05% (LIDEX) 0.05 % cream Apply topically 2 (two) times daily.    hydrOXYzine HCl (ATARAX) 25 MG tablet Take 1 tablet (25 mg total) by mouth every 6 (six) hours.    ketorolac (TORADOL) 10 mg tablet Take 1 tablet (10 mg total) by mouth every 6 (six) hours.     Family History     Problem Relation (Age of Onset)    Heart attack Mother (57)    Stroke Father        Social History Main Topics    Smoking status: Current Every Day Smoker     Packs/day: 0.50     Years: 35.00     Types: Cigarettes    Smokeless tobacco: Not on file    Alcohol use No    Drug use:      Types: Marijuana, Benzodiazepines    Sexual activity: Not on file     Review of Systems   Constitutional: Positive for activity change, appetite change, fatigue and fever.   HENT: Positive for congestion and rhinorrhea. Negative for sore throat and trouble swallowing.    Respiratory: Positive for cough. Negative for apnea, chest tightness, shortness of breath and wheezing.    Cardiovascular: Negative for chest pain, palpitations and leg swelling.   Gastrointestinal: Negative for abdominal distention, abdominal pain, blood in stool, constipation, diarrhea, nausea and vomiting.   Endocrine: Negative for polydipsia, polyphagia and polyuria.   Genitourinary: Negative for difficulty urinating, dysuria, frequency, hematuria and urgency.   Musculoskeletal: Positive for arthralgias and back pain. Negative for gait problem, joint swelling  and myalgias.   Skin: Negative for pallor, rash and wound.   Neurological: Positive for dizziness and headaches. Negative for seizures, syncope, speech difficulty, weakness, light-headedness and numbness.   Psychiatric/Behavioral: Negative for confusion. The patient is not nervous/anxious.    All other systems reviewed and are negative.    Objective:     Vital Signs (Most Recent):  Temp: 98.2 °F (36.8 °C) (06/22/17 1412)  Pulse: 75 (06/22/17 1217)  Resp: 16 (06/22/17 1217)  BP: 110/64 (06/22/17 1217)  SpO2: 100 % (06/22/17 1217) Vital Signs (24h Range):  Temp:  [98.2 °F (36.8 °C)-99 °F (37.2 °C)] 98.2 °F (36.8 °C)  Pulse:  [] 75  Resp:  [15-21] 16  SpO2:  [96 %-100 %] 100 %  BP: ()/(53-64) 110/64     Weight: 45.8 kg (101 lb)  Body mass index is 18.77 kg/m².    Physical Exam   Constitutional: She is oriented to person, place, and time. She appears well-developed and well-nourished.   HENT:   Head: Normocephalic and atraumatic.   Eyes: Conjunctivae and EOM are normal. Pupils are equal, round, and reactive to light.   Neck: Neck supple. No JVD present. No thyromegaly present.   Cardiovascular: Normal rate, regular rhythm, normal heart sounds and intact distal pulses.  Exam reveals no gallop and no friction rub.    No murmur heard.  Pulmonary/Chest: Effort normal. No respiratory distress. She has no wheezes. She has no rales.   Diminished breath sounds.   Abdominal: Soft. Bowel sounds are normal. She exhibits no distension. There is no tenderness.   Musculoskeletal: Normal range of motion. She exhibits no edema, tenderness or deformity.   Neurological: She is alert and oriented to person, place, and time. She has normal reflexes. No cranial nerve deficit.   Skin: Skin is warm and dry. No rash noted. No erythema.   Psychiatric: She has a normal mood and affect. Judgment normal.   Nursing note and vitals reviewed.       Significant Labs:   BMP:   Recent Labs  Lab 06/22/17  1103   GLU 95      K 3.9   CL  98   CO2 27   BUN 14   CREATININE 1.4   CALCIUM 7.6*     CBC:   Recent Labs  Lab 06/22/17  1103   WBC 21.04*   HGB 10.6*   HCT 30.8*        TSH:   Recent Labs  Lab 06/22/17  1242   TSH 2.610     Urine Studies:   Recent Labs  Lab 06/22/17  1055   COLORU Yellow   APPEARANCEUA Clear   PHUR 8.0   SPECGRAV 1.010   PROTEINUA Negative   GLUCUA Negative   KETONESU Negative   BILIRUBINUA Negative   OCCULTUA Trace*   NITRITE Negative   UROBILINOGEN Negative   LEUKOCYTESUR 1+*   RBCUA 1   WBCUA 12*   SQUAMEPITHEL 1     All pertinent labs within the past 24 hours have been reviewed.    Significant Imaging:    Imaging Results          US Abdomen Limited (Final result)  Result time 06/22/17 13:18:51    Final result by Jose D Ferrell III, MD (06/22/17 13:18:51)                 Impression:          1.  Mild dilation proximal common bile duct without obstructing lesion identified.  2.  Mildly complex 1.7 cm right renal cyst.  Otherwise unremarkable exam.         Electronically signed by: JOSE D FERRELL MD  Date:     06/22/17  Time:    13:18              Narrative:    US ABDOMEN LIMITED    History: Right upper quadrant pain.    Findings: The liver measures 12.9 cm in length. The liver is normal in appearance without focal hepatic mass identified. The gallbladder is normal in appearance without evidence of cholelithiasis or acute cholecystitis. Sonographic Flores's sign is negative.  There is mild atrophy of the proximal common bile duct measuring up to 8.5 mm in diameter.  There is normal gradual tapering of the more distal common duct without obstructing calculus identified.  There is no intrahepatic biliary ductal dilation.  The very distal periampullary CBD is obscured by bowel gas.  There is a 1.7 cm mildly complex cyst in the mid right kidney with mild wall thickening or internal debris.  The right kidney is otherwise unremarkable. The visualized portions of the pancreas are normal.  The visualized aspects of the aorta and  IVC are normal in caliber.  Color and spectral Doppler evaluation demonstrated normal antegrade flow and normal waveforms in the portal vein. No ascites identified.                             X-Ray Chest PA And Lateral (Final result)  Result time 06/22/17 11:33:46    Final result by Jose D Ferrell III, MD (06/22/17 11:33:46)                 Impression:     No acute disease identified in the chest.      Electronically signed by: JOSE D FERRELL MD  Date:     06/22/17  Time:    11:33              Narrative:    XR CHEST PA AND LATERAL    Clinical history: fever    Findings: The lungs appear clear of active disease. There is no evidence of pneumonia, mass, effusion, pneumothorax or other acute pulmonary disease.  Cardiomediastinal silhouette is within normal limits.  No acute osseous abnormality detected.

## 2017-06-22 NOTE — HPI
"Ms. Severio is a 52yo female with a PMHx of hypertension, Grave's disease s/p thyroidectomy, asthma, and chronic back pain.  She presents to the ED with c/o intermittent fever (T-Max 104) x 2 weeks.  Temperature this AM of 102.  Associated arthralgias, chills, decreased appetite, congestion, nonproductive cough, dizziness, HA, nausea, vomiting x 1 day, and diarrhea x 1 day.  Denies any rashes or wounds, abdominal pain or distention, melena, dysuria, frequency, urgency, or hematuria.  She was seen in Urgent Care 2 weeks ago, and was diagnosed with "the flu", however, was not tested for influenza.  She was given cough syrup and an unknown pill that patient thinks was either an antibiotic or steroid, was not Tamiflu.  Labs in ED resulted WBC 21, UA consistent with UTI, UDS positive for opiates, benzos, and THC.  Patient noted to be hypotensive on arrival to ED with BP 85/53, which responded to IV bolus (110/64).  "

## 2017-06-23 VITALS
SYSTOLIC BLOOD PRESSURE: 116 MMHG | HEART RATE: 85 BPM | OXYGEN SATURATION: 97 % | HEIGHT: 61 IN | RESPIRATION RATE: 18 BRPM | WEIGHT: 101 LBS | DIASTOLIC BLOOD PRESSURE: 69 MMHG | TEMPERATURE: 101 F | BODY MASS INDEX: 19.07 KG/M2

## 2017-06-23 PROBLEM — R65.10 SIRS (SYSTEMIC INFLAMMATORY RESPONSE SYNDROME): Status: RESOLVED | Noted: 2017-06-22 | Resolved: 2017-06-23

## 2017-06-23 LAB
ANION GAP SERPL CALC-SCNC: 8 MMOL/L
BASOPHILS # BLD AUTO: 0.01 K/UL
BASOPHILS NFR BLD: 0.1 %
BUN SERPL-MCNC: 10 MG/DL
CALCIUM SERPL-MCNC: 7.4 MG/DL
CHLORIDE SERPL-SCNC: 105 MMOL/L
CO2 SERPL-SCNC: 24 MMOL/L
CREAT SERPL-MCNC: 1 MG/DL
DIFFERENTIAL METHOD: ABNORMAL
EOSINOPHIL # BLD AUTO: 0.1 K/UL
EOSINOPHIL NFR BLD: 0.5 %
ERYTHROCYTE [DISTWIDTH] IN BLOOD BY AUTOMATED COUNT: 14.8 %
EST. GFR  (AFRICAN AMERICAN): >60 ML/MIN/1.73 M^2
EST. GFR  (NON AFRICAN AMERICAN): >60 ML/MIN/1.73 M^2
GLUCOSE SERPL-MCNC: 77 MG/DL
HCT VFR BLD AUTO: 28.8 %
HGB BLD-MCNC: 9.7 G/DL
LYMPHOCYTES # BLD AUTO: 1.8 K/UL
LYMPHOCYTES NFR BLD: 9.8 %
MCH RBC QN AUTO: 29.7 PG
MCHC RBC AUTO-ENTMCNC: 33.7 %
MCV RBC AUTO: 88 FL
MONOCYTES # BLD AUTO: 1 K/UL
MONOCYTES NFR BLD: 5.4 %
NEUTROPHILS # BLD AUTO: 15.6 K/UL
NEUTROPHILS NFR BLD: 84.2 %
PLATELET # BLD AUTO: 330 K/UL
PMV BLD AUTO: 9.1 FL
POTASSIUM SERPL-SCNC: 4.5 MMOL/L
RBC # BLD AUTO: 3.27 M/UL
SODIUM SERPL-SCNC: 137 MMOL/L
WBC # BLD AUTO: 18.52 K/UL

## 2017-06-23 PROCEDURE — 80048 BASIC METABOLIC PNL TOTAL CA: CPT

## 2017-06-23 PROCEDURE — 36415 COLL VENOUS BLD VENIPUNCTURE: CPT

## 2017-06-23 PROCEDURE — 85025 COMPLETE CBC W/AUTO DIFF WBC: CPT

## 2017-06-23 PROCEDURE — 25000003 PHARM REV CODE 250: Performed by: NURSE PRACTITIONER

## 2017-06-23 PROCEDURE — 25000003 PHARM REV CODE 250: Performed by: EMERGENCY MEDICINE

## 2017-06-23 RX ORDER — LEVOFLOXACIN 250 MG/1
250 TABLET ORAL DAILY
Qty: 3 TABLET | Refills: 0 | Status: SHIPPED | OUTPATIENT
Start: 2017-06-23 | End: 2017-09-05 | Stop reason: ALTCHOICE

## 2017-06-23 RX ORDER — ACETAMINOPHEN 325 MG/1
650 TABLET ORAL EVERY 8 HOURS PRN
Status: DISCONTINUED | OUTPATIENT
Start: 2017-06-22 | End: 2017-06-23 | Stop reason: HOSPADM

## 2017-06-23 RX ORDER — ACETAMINOPHEN 325 MG/1
650 TABLET ORAL EVERY 8 HOURS PRN
Refills: 0 | COMMUNITY
Start: 2017-06-23 | End: 2019-05-06

## 2017-06-23 RX ADMIN — SODIUM CHLORIDE: 0.9 INJECTION, SOLUTION INTRAVENOUS at 03:06

## 2017-06-23 RX ADMIN — CALCIUM 500 MG: 500 TABLET ORAL at 08:06

## 2017-06-23 RX ADMIN — DIVALPROEX SODIUM 500 MG: 500 TABLET, DELAYED RELEASE ORAL at 08:06

## 2017-06-23 RX ADMIN — FAMOTIDINE 20 MG: 20 TABLET ORAL at 08:06

## 2017-06-23 RX ADMIN — LEVOTHYROXINE SODIUM 112 MCG: 112 TABLET ORAL at 06:06

## 2017-06-23 NOTE — PLAN OF CARE
Problem: Patient Care Overview  Goal: Plan of Care Review  Outcome: Ongoing (interventions implemented as appropriate)  Patient being discharged home today. Pain controlled. And patient states she is ready to go home and feels better.

## 2017-06-23 NOTE — PLAN OF CARE
06/23/17 1112   Final Note   Assessment Type Final Discharge Note   Discharge Disposition Home

## 2017-06-23 NOTE — HOSPITAL COURSE
Got treatment with steroids and cough syrup for mild URI 2 weeks ago but symptoms and fever persist.  She remained quite stable clinically.  Suspected some portion of her leukocytosis is due to recent steroid use.  Bcxs NGTD and Ucx results pending. Received IV abx (Rocephin) overnight.  Had febrile episode but patient completely asymptomatic.  Patient reported wanting to be discharged.  Noted stable clinically. Discussed thought process and recommended to monitor. Verbalized understanding.  Fever resolved on repeat measurement.  Deemed stable for discharge with Levofloxacin PO and outpatient follow up.      Addendum:  Called by lab to be informed regarding + Bcx growing GNR 1 out of 2 bottles.  I&D still pending. Communicated patient and refers being asymptomatic. Patient informed regarding results and importance of taking antibiotic as recommended and imperative to follow up.  Signed out to housestaff following tomorrow for I&D follow up.

## 2017-06-23 NOTE — PROGRESS NOTES
Discharge instructions given. Verbalized understanding. IV removed tip intact. Off floor via wheelchair.

## 2017-06-23 NOTE — DISCHARGE SUMMARY
"[facility] Hospital Medicine  Discharge Summary      Patient Name: Donna G Severio  MRN: [id number]  Admission Date: 6/22/2017  Hospital Length of Stay: 1 days  Discharge Date and Time:  06/23/2017 10:09 AM  Attending Physician: Emil Santamaria MD   Discharging Provider: Emil Santamaria MD  Primary Care Provider: Chuy Kirkland Jr, MD      HPI:   Ms. Severio is a 52yo female with a PMHx of hypertension, Grave's disease s/p thyroidectomy, asthma, and chronic back pain.  She presents to the ED with c/o intermittent fever (T-Max 104) x 2 weeks.  Temperature this AM of 102.  Associated arthralgias, chills, decreased appetite, congestion, nonproductive cough, dizziness, HA, nausea, vomiting x 1 day, and diarrhea x 1 day.  Denies any rashes or wounds, abdominal pain or distention, melena, dysuria, frequency, urgency, or hematuria.  She was seen in Urgent Care 2 weeks ago, and was diagnosed with "the flu", however, was not tested for influenza.  She was given cough syrup and an unknown pill that patient thinks was either an antibiotic or steroid, was not Tamiflu.  Labs in ED resulted WBC 21, UA consistent with UTI, UDS positive for opiates, benzos, and THC.  Patient noted to be hypotensive on arrival to ED with BP 85/53, which responded to IV bolus (110/64).    * No surgery found *      Indwelling Lines/Drains at time of discharge:   Lines/Drains/Airways          No matching active lines, drains, or airways        Hospital Course:   Got treatment with steroids and cough syrup for mild URI 2 weeks ago but symptoms and fever persist.  She  remained quite stable clinically.  Suspect ed some portion of her leukocytosis is due to recent steroid use.  Bcxs NGTD and Ucx results pending. Received IV abx (Rocephin) overnight.  Had febrile episode but patient completely asymptomatic.  Patient reported wanting to be discharged.  Noted stable clinically. Discussed thought process and recommended to monitor. Verbalized " understanding.  Fever resolved on repeat measurement.  Deemed stable for discharge with Levofloxacin PO and outpatient follow up.       Consults:   Consults         Status Ordering Provider     Inpatient consult to Hospitalist  Once     Provider:  (Not yet assigned)    Acknowledged [facility]          Significant Diagnostic Studies:   Labs: All labs within the past 24 hours have been reviewed  Specimen (12h ago through future)    None          Pending Diagnostic Studies:     None        Final Active Diagnoses:    Diagnosis Date Noted POA    UTI (urinary tract infection) [N39.0] 06/22/2017 Yes      Problems Resolved During this Admission:    Diagnosis Date Noted Date Resolved POA    PRINCIPAL PROBLEM:  SIRS (systemic inflammatory response syndrome) [R65.10] 06/22/2017 06/23/2017 Yes      UTI (urinary tract infection)    Urine culture pending.  IV Rocephin given inpatient              Discharged Condition: stable    Disposition: Home or Self Care    Follow Up:  Follow-up Information     Chuy Kirkland Jr, MD In 1 week.    Specialty:  Internal Medicine  Contact information:  [address] [facility] [address] [phone]                 Patient Instructions:     Diet general     Call MD for:  persistent nausea and vomiting or diarrhea     Call MD for:  severe uncontrolled pain     Call MD for:  difficulty breathing or increased cough     Call MD for:  temperature >100.4       Medications:  Reconciled Home Medications:   Current Discharge Medication List      START taking these medications    Details   acetaminophen (TYLENOL) 325 MG tablet Take 2 tablets (650 mg total) by mouth every 8 (eight) hours as needed for Temperature greater than (or equal to 101 degree F).  Refills: 0      levoFLOXacin (LEVAQUIN) 250 MG tablet Take 1 tablet (250 mg total) by mouth once daily.  Qty: 3 tablet, Refills: 0         CONTINUE these medications which have NOT CHANGED    Details   ALBUTEROL SULFATE (PROAIR HFA  INHL) Inhale into the lungs.      calcium carbonate (OS-MELVIN) 600 mg (1,500 mg) Tab Take 600 mg by mouth 2 (two) times daily with meals.      calcium-vitamin D3 500 mg(1,250mg) -200 unit per tablet Take 1 tablet by mouth.      cyclobenzaprine (FLEXERIL) 10 MG tablet Take 10 mg by mouth 3 (three) times daily as needed for Muscle spasms.      diazePAM (VALIUM) 10 MG Tab Take 10 mg by mouth.      divalproex (DEPAKOTE) 500 MG TbEC TAKE 1 TABLET BY MOUTH TWICE DAILY      fluticasone (FLONASE) 50 mcg/actuation nasal spray 1 spray by Each Nare route once daily.      fluticasone-vilanterol (BREO ELLIPTA) 100-25 mcg/dose diskus inhaler Inhale 1 puff into the lungs once daily. Controller      ipratropium-albuterol (COMBIVENT RESPIMAT)  mcg/actuation inhaler Inhale 1 puff into the lungs 4 (four) times daily. Rescue      levothyroxine (SYNTHROID) 112 MCG tablet One tablet by mouth daily  Indications: HYPOTHYROIDISM      lisinopril 10 MG tablet Take 10 mg by mouth once daily.      magnesium 250 mg Tab Take by mouth.      meloxicam (MOBIC) 15 MG tablet Take 15 mg by mouth once daily.      multivitamin (THERAGRAN) per tablet Take 1 tablet by mouth.      omeprazole (PRILOSEC) 40 MG capsule Take 40 mg by mouth once daily.      sumatriptan (IMITREX) 100 MG tablet Take 100 mg by mouth every 2 (two) hours as needed for Migraine.      fluocinonide 0.05% (LIDEX) 0.05 % cream Apply topically 2 (two) times daily.  Qty: 60 g, Refills: 0      hydrOXYzine HCl (ATARAX) 25 MG tablet Take 1 tablet (25 mg total) by mouth every 6 (six) hours.  Qty: 12 tablet, Refills: 0      ketorolac (TORADOL) 10 mg tablet Take 1 tablet (10 mg total) by mouth every 6 (six) hours.  Qty: 11 tablet, Refills: 0           Time spent on the discharge of patient: 30 minutes    Emil Santamaria MD  Department of Hospital Medicine  [facility]

## 2017-06-23 NOTE — PROGRESS NOTES
Pt refused neb txs this morning. Pt states that they give her terrible headaches and makes her feel sick.

## 2017-06-23 NOTE — PROGRESS NOTES
"Pt requesting home meds Flexeril, Mobic and Valium for "leg pain." Dr. Rome notified, stated pt can have Tylenol for pain. Existing order modified to include pain.   Will continue to monitor.    "

## 2017-06-23 NOTE — PLAN OF CARE
Problem: Patient Care Overview  Goal: Plan of Care Review  Pt verbalized understanding of plan of care. Fall precautions maintained.  Bed locked and low.  Call light and personal items within reach. SR up x 2. 12 h chart check complete.

## 2017-06-24 NOTE — DISCHARGE SUMMARY
"[facility] Hospital Medicine  Discharge Summary      Patient Name: Donna G Severio  MRN: [id number]  Admission Date: 6/22/2017  Hospital Length of Stay: 1 days  Discharge Date and Time:  06/23/2017 7:09 PM  Attending Physician: No att. providers found   Discharging Provider: Emil Santamaria MD  Primary Care Provider: Chuy Kirkland Jr, MD      HPI:   Ms. Severio is a 52yo female with a PMHx of hypertension, Grave's disease s/p thyroidectomy, asthma, and chronic back pain.  She presents to the ED with c/o intermittent fever (T-Max 104) x 2 weeks.  Temperature this AM of 102.  Associated arthralgias, chills, decreased appetite, congestion, nonproductive cough, dizziness, HA, nausea, vomiting x 1 day, and diarrhea x 1 day.  Denies any rashes or wounds, abdominal pain or distention, melena, dysuria, frequency, urgency, or hematuria.  She was seen in Urgent Care 2 weeks ago, and was diagnosed with "the flu", however, was not tested for influenza.  She was given cough syrup and an unknown pill that patient thinks was either an antibiotic or steroid, was not Tamiflu.  Labs in ED resulted WBC 21, UA consistent with UTI, UDS positive for opiates, benzos, and THC.  Patient noted to be hypotensive on arrival to ED with BP 85/53, which responded to IV bolus (110/64).    * No surgery found *      Indwelling Lines/Drains at time of discharge:   Lines/Drains/Airways          No matching active lines, drains, or airways        Hospital Course:   Got treatment with steroids and cough syrup for mild URI 2 weeks ago but symptoms and fever persist.  She  remained quite stable clinically.  Suspect ed some portion of her leukocytosis is due to recent steroid use.  Bcxs NGTD and Ucx results pending. Received IV abx (Rocephin) overnight.  Had febrile episode but patient completely asymptomatic.  Patient reported wanting to be discharged.  Noted stable clinically. Discussed thought process and recommended to monitor. " Verbalized understanding.  Fever resolved on repeat measurement.  Deemed stable for discharge with Levofloxacin PO and outpatient follow up.      Addendum:  Called by lab to be informed regarding + Bcx growing GNR 1 out of 2 bottles.  I&D still pending. Communicated patient and refers being asymptomatic. Patient informed regarding results and importance of taking antibiotic as recommended and imperative to follow up.  Signed out to housestaff following tomorrow for I&D follow up.     Consults:     Significant Diagnostic Studies: Labs: All labs within the past 24 hours have been reviewed  Microbiology:   Blood Culture   Lab Results   Component Value Date    LABBLOO No Growth to date 06/22/2017       Pending Diagnostic Studies:     None        Final Active Diagnoses:    Diagnosis Date Noted POA    UTI (urinary tract infection) [N39.0] 06/22/2017 Yes      Problems Resolved During this Admission:    Diagnosis Date Noted Date Resolved POA    PRINCIPAL PROBLEM:  SIRS (systemic inflammatory response syndrome) [R65.10] 06/22/2017 06/23/2017 Yes      No new Assessment & Plan notes have been filed under this hospital service since the last note was generated.  Service: Hospital Medicine      Discharged Condition: stable    Disposition: Home or Self Care    Follow Up:  Follow-up Information     Chuy Kirkland Jr, MD In 1 week.    Specialty:  Internal Medicine  Why:  Patient to make appt with non [facility] MD  [facility]  [address] [facility] [address] [phone]                 Patient Instructions:     Diet general     Call MD for:  persistent nausea and vomiting or diarrhea     Call MD for:  severe uncontrolled pain     Call MD for:  difficulty breathing or increased cough     Call MD for:  temperature >100.4       Medications:  Reconciled Home Medications:   Discharge Medication List as of 6/23/2017 11:01 AM      START taking these medications    Details   acetaminophen (TYLENOL) 325 MG tablet  Take 2 tablets (650 mg total) by mouth every 8 (eight) hours as needed for Temperature greater than (or equal to 101 degree F)., Starting Fri 6/23/2017, OTC      levoFLOXacin (LEVAQUIN) 250 MG tablet Take 1 tablet (250 mg total) by mouth once daily., Starting Fri 6/23/2017, Print         CONTINUE these medications which have NOT CHANGED    Details   ALBUTEROL SULFATE (PROAIR HFA INHL) Inhale into the lungs., Historical Med      calcium carbonate (OS-MELVIN) 600 mg (1,500 mg) Tab Take 600 mg by mouth 2 (two) times daily with meals., Historical Med      calcium-vitamin D3 500 mg(1,250mg) -200 unit per tablet Take 1 tablet by mouth., Starting Tue 2/19/2013, Historical Med      cyclobenzaprine (FLEXERIL) 10 MG tablet Take 10 mg by mouth 3 (three) times daily as needed for Muscle spasms., Historical Med      diazePAM (VALIUM) 10 MG Tab Take 10 mg by mouth., Starting Fri 4/4/2014, Historical Med      divalproex (DEPAKOTE) 500 MG TbEC TAKE 1 TABLET BY MOUTH TWICE DAILY, Historical Med      fluticasone (FLONASE) 50 mcg/actuation nasal spray 1 spray by Each Nare route once daily., Historical Med      fluticasone-vilanterol (BREO ELLIPTA) 100-25 mcg/dose diskus inhaler Inhale 1 puff into the lungs once daily. Controller, Historical Med      ipratropium-albuterol (COMBIVENT RESPIMAT)  mcg/actuation inhaler Inhale 1 puff into the lungs 4 (four) times daily. Rescue, Historical Med      levothyroxine (SYNTHROID) 112 MCG tablet One tablet by mouth daily  Indications: HYPOTHYROIDISM, Historical Med      lisinopril 10 MG tablet Take 10 mg by mouth once daily., Historical Med      magnesium 250 mg Tab Take by mouth., Historical Med      meloxicam (MOBIC) 15 MG tablet Take 15 mg by mouth once daily., Historical Med      multivitamin (THERAGRAN) per tablet Take 1 tablet by mouth., Historical Med      omeprazole (PRILOSEC) 40 MG capsule Take 40 mg by mouth once daily., Historical Med      sumatriptan (IMITREX) 100 MG tablet Take 100  mg by mouth every 2 (two) hours as needed for Migraine., Historical Med      fluocinonide 0.05% (LIDEX) 0.05 % cream Apply topically 2 (two) times daily., Starting 5/14/2016, Until Tue 5/24/16, Print      hydrOXYzine HCl (ATARAX) 25 MG tablet Take 1 tablet (25 mg total) by mouth every 6 (six) hours., Starting 5/14/2016, Until Discontinued, Print      ketorolac (TORADOL) 10 mg tablet Take 1 tablet (10 mg total) by mouth every 6 (six) hours., Starting 5/5/2016, Until Discontinued, Print           Time spent on the discharge of patient: 30 minutes    Emil Santamaria MD  Department of Hospital Medicine  [facility]

## 2017-06-25 LAB
BACTERIA BLD CULT: NORMAL
BACTERIA UR CULT: NORMAL

## 2017-06-27 LAB — BACTERIA BLD CULT: NORMAL

## 2017-06-30 NOTE — PHYSICIAN QUERY
PT Name: Donna G Severio  MR #: [id number]    Physician Query Form - Emergency Medicine Diagnosis Clarification     CDS/: Doug Chandler               Contact information: [email or web address]  This form is a permanent document in the medical record.     Query Date: June 30, 2017    By submitting this query, we are merely seeking further clarification of documentation.  Please utilize your independent clinical judgment when addressing the question(s) below.      The Medical record contains the following:     Diagnosis Supporting Clinical Information Location in Medical Record   Sepsis WBC= 21.04; WBC= 18.52    Hypotensive on arrival to ED with BP 85/53; Leukocytosis    UTI, Will start Rocephin for now and follow cultures.    UTI  SIRS 6/22 & 6/23 Lab    ED Note      H & P      Discharge Summary      Do you agree with the Emergency Medicine diagnosis of Sepsis?    [  ] Yes  [ X ] Yes, but it resolved prior to my assessment of the patient  [  ] No  [  ] Clinically Insignificant  [  ] Other/Clarification of Findings:_________________________________  [  ] Clinically Undetermined    Please document in your progress notes daily for the duration of treatment until resolved and include in your discharge summary.

## 2017-09-05 ENCOUNTER — HOSPITAL ENCOUNTER (EMERGENCY)
Facility: HOSPITAL | Age: 52
Discharge: HOME | End: 2017-09-05
Payer: MEDICAID

## 2017-09-05 VITALS
OXYGEN SATURATION: 96 % | DIASTOLIC BLOOD PRESSURE: 78 MMHG | WEIGHT: 101 LBS | TEMPERATURE: 100 F | SYSTOLIC BLOOD PRESSURE: 123 MMHG | HEIGHT: 61 IN | BODY MASS INDEX: 19.07 KG/M2 | RESPIRATION RATE: 18 BRPM | HEART RATE: 105 BPM

## 2017-09-05 DIAGNOSIS — J18.9 PNEUMONIA OF RIGHT UPPER LOBE DUE TO INFECTIOUS ORGANISM: Primary | ICD-10-CM

## 2017-09-05 DIAGNOSIS — B37.0 ORAL THRUSH: ICD-10-CM

## 2017-09-05 LAB
BACTERIA #/AREA URNS HPF: ABNORMAL /HPF
BILIRUB UR QL STRIP: NEGATIVE
CLARITY UR: ABNORMAL
COLOR UR: YELLOW
FLUAV AG SPEC QL IA: NEGATIVE
FLUBV AG SPEC QL IA: NEGATIVE
GLUCOSE UR QL STRIP: NEGATIVE
HGB UR QL STRIP: ABNORMAL
KETONES UR QL STRIP: NEGATIVE
LEUKOCYTE ESTERASE UR QL STRIP: NEGATIVE
MICROSCOPIC COMMENT: ABNORMAL
NITRITE UR QL STRIP: NEGATIVE
PH UR STRIP: 7 [PH] (ref 5–8)
PROT UR QL STRIP: NEGATIVE
RBC #/AREA URNS HPF: 4 /HPF (ref 0–4)
SP GR UR STRIP: <=1.005 (ref 1–1.03)
SPECIMEN SOURCE: NORMAL
SQUAMOUS #/AREA URNS HPF: 20 /HPF
URN SPEC COLLECT METH UR: ABNORMAL
UROBILINOGEN UR STRIP-ACNC: NEGATIVE EU/DL
WBC #/AREA URNS HPF: 1 /HPF (ref 0–5)

## 2017-09-05 PROCEDURE — 25000003 PHARM REV CODE 250: Performed by: PHYSICIAN ASSISTANT

## 2017-09-05 PROCEDURE — 81000 URINALYSIS NONAUTO W/SCOPE: CPT

## 2017-09-05 PROCEDURE — 87400 INFLUENZA A/B EACH AG IA: CPT | Mod: 59

## 2017-09-05 PROCEDURE — 99284 EMERGENCY DEPT VISIT MOD MDM: CPT

## 2017-09-05 RX ORDER — FLUCONAZOLE 100 MG/1
100 TABLET ORAL DAILY
Qty: 7 TABLET | Refills: 0 | Status: SHIPPED | OUTPATIENT
Start: 2017-09-05 | End: 2017-09-13

## 2017-09-05 RX ORDER — LEVOFLOXACIN 500 MG/1
500 TABLET, FILM COATED ORAL DAILY
Qty: 7 TABLET | Refills: 0 | Status: SHIPPED | OUTPATIENT
Start: 2017-09-05 | End: 2017-09-12

## 2017-09-05 RX ORDER — FLUCONAZOLE 100 MG/1
100 TABLET ORAL DAILY
Qty: 7 TABLET | Refills: 0 | Status: SHIPPED | OUTPATIENT
Start: 2017-09-05 | End: 2017-09-05

## 2017-09-05 RX ORDER — IBUPROFEN 600 MG/1
600 TABLET ORAL
Status: COMPLETED | OUTPATIENT
Start: 2017-09-05 | End: 2017-09-05

## 2017-09-05 RX ORDER — CODEINE PHOSPHATE AND GUAIFENESIN 10; 100 MG/5ML; MG/5ML
10 SOLUTION ORAL EVERY 6 HOURS PRN
Qty: 180 ML | Refills: 0 | Status: SHIPPED | OUTPATIENT
Start: 2017-09-05 | End: 2017-09-15

## 2017-09-05 RX ADMIN — IBUPROFEN 600 MG: 600 TABLET, FILM COATED ORAL at 11:09

## 2017-09-05 NOTE — DISCHARGE INSTRUCTIONS
Fluids  Rest  Alternate tylenol and motrin  Meds as prescribed  Complete all antibiotics.   Discuss of inhalers with PCP since you have developed thrush

## 2017-09-05 NOTE — ED PROVIDER NOTES
"Encounter Date: 9/5/2017       History     Chief Complaint   Patient presents with    Cough     patient c/o cough, congestion, body aches and fever for over a week      51 yo wf reports to ED c/o 1 week hx of cough, chest congestion, nasal congestion, post nasal drip, diffuse body aches, fever. She believes symptoms started last Tuesday or Wednesday. She has measured her temperature intermittently with tmax up to 102.  She has not taken anything today for her fever.  She has hx of asthma and is a smoker. She uses Breo, Spiriva, and Albuterol inhalers which control her asthma fairly well.  She also has a coating in her mouth that "feels funny" and she just noticed a couple of days ago.  She has a documented hx of Hep C but states that was a long time ago and since that time she has be re-tested and told that was an inaccurate diagnosis.  She states she has been tested for HIV before and refuses testing today.  She denies any rash or joint swelling; just diffuse myalgias.  Patient was admitted here 3 months ago for a febrile illness x 2 weeks associated with uri symptoms and evidently also had a UTI. At that time she was hypotensive with signs of sepsis.            Review of patient's allergies indicates:   Allergen Reactions    Benadryl [diphenhydramine hcl]     Penicillins Nausea And Vomiting     Past Medical History:   Diagnosis Date    Asthma     Back pain     Hypertension     Thyroid disease     hyperthyroid     Past Surgical History:   Procedure Laterality Date    HYSTERECTOMY      THYROIDECTOMY       Family History   Problem Relation Age of Onset    Heart attack Mother 57    Stroke Father      Social History   Substance Use Topics    Smoking status: Current Every Day Smoker     Packs/day: 0.50     Years: 35.00     Types: Cigarettes    Smokeless tobacco: Never Used    Alcohol use No     Review of Systems   Constitutional: Positive for activity change, appetite change, chills and fatigue.   HENT: " Positive for congestion, rhinorrhea and sinus pressure. Negative for sore throat.    Respiratory: Positive for cough. Negative for shortness of breath.    Cardiovascular: Negative for chest pain.   Gastrointestinal: Negative for nausea.   Genitourinary: Negative for dysuria, flank pain and frequency.   Musculoskeletal: Negative for back pain.   Skin: Negative for rash.   Neurological: Negative for weakness.   Hematological: Does not bruise/bleed easily.       Physical Exam     Initial Vitals [09/05/17 1106]   BP Pulse Resp Temp SpO2   (!) 155/79 107 18 100.1 °F (37.8 °C) 98 %      MAP       104.33         Physical Exam    Nursing note and vitals reviewed.  Constitutional: She appears well-developed and well-nourished. No distress.   HENT:   Head: Normocephalic and atraumatic.   Right Ear: Tympanic membrane normal.   Left Ear: Tympanic membrane normal.   Nose: Mucosal edema and rhinorrhea present.   Mouth/Throat: Uvula is midline. No posterior oropharyngeal erythema.   Scant whitish exudate posterior pharynx, palate, and inside cheeks c/w thrush.     Neck: Normal range of motion. Neck supple.   Cardiovascular: Normal rate, regular rhythm and normal heart sounds.   Pulmonary/Chest: She has no wheezes. She has rhonchi. She has no rales.   Lymphadenopathy:     She has no cervical adenopathy.   Neurological: She is alert and oriented to person, place, and time.   Skin: Skin is warm and dry. No rash noted.   Psychiatric: She has a normal mood and affect. Thought content normal.         ED Course   Procedures  Labs Reviewed - No data to display          Medical Decision Making:   Clinical Tests:   Radiological Study: Reviewed  ED Management:  RUL infiltrate                   ED Course      Clinical Impression:   The primary encounter diagnosis was Pneumonia of right upper lobe due to infectious organism. A diagnosis of Oral thrush was also pertinent to this visit.    Disposition:   Disposition: Discharged  Condition:  Stable  Stable. Candidate for outpatient treatment                        Joanna Harrison PA-C  09/05/17 3683

## 2017-09-05 NOTE — ED NOTES
Bed: Int 24  Expected date:   Expected time:   Means of arrival:   Comments:  Sergey Harrison PA-C  09/05/17 1115

## 2017-09-13 ENCOUNTER — HOSPITAL ENCOUNTER (EMERGENCY)
Facility: HOSPITAL | Age: 52
Discharge: HOME | End: 2017-09-13
Attending: EMERGENCY MEDICINE
Payer: MEDICAID

## 2017-09-13 VITALS
OXYGEN SATURATION: 99 % | RESPIRATION RATE: 20 BRPM | DIASTOLIC BLOOD PRESSURE: 91 MMHG | HEIGHT: 61 IN | TEMPERATURE: 99 F | SYSTOLIC BLOOD PRESSURE: 157 MMHG | WEIGHT: 97 LBS | BODY MASS INDEX: 18.31 KG/M2 | HEART RATE: 74 BPM

## 2017-09-13 DIAGNOSIS — Z72.0 TOBACCO ABUSE DISORDER: ICD-10-CM

## 2017-09-13 DIAGNOSIS — J18.9 CHRONIC PNEUMONIA: ICD-10-CM

## 2017-09-13 DIAGNOSIS — J44.1 ACUTE EXACERBATION OF CHRONIC OBSTRUCTIVE PULMONARY DISEASE (COPD): Primary | ICD-10-CM

## 2017-09-13 LAB
ALBUMIN SERPL BCP-MCNC: 2.7 G/DL
ALP SERPL-CCNC: 68 U/L
ALT SERPL W/O P-5'-P-CCNC: 10 U/L
ANION GAP SERPL CALC-SCNC: 9 MMOL/L
AST SERPL-CCNC: 17 U/L
BASOPHILS # BLD AUTO: 0.02 K/UL
BASOPHILS NFR BLD: 0.2 %
BILIRUB SERPL-MCNC: 0.2 MG/DL
BUN SERPL-MCNC: 13 MG/DL
CALCIUM SERPL-MCNC: 8.7 MG/DL
CHLORIDE SERPL-SCNC: 102 MMOL/L
CO2 SERPL-SCNC: 29 MMOL/L
CREAT SERPL-MCNC: 1 MG/DL
DIFFERENTIAL METHOD: ABNORMAL
EOSINOPHIL # BLD AUTO: 0 K/UL
EOSINOPHIL NFR BLD: 0.3 %
ERYTHROCYTE [DISTWIDTH] IN BLOOD BY AUTOMATED COUNT: 13.5 %
EST. GFR  (AFRICAN AMERICAN): >60 ML/MIN/1.73 M^2
EST. GFR  (NON AFRICAN AMERICAN): >60 ML/MIN/1.73 M^2
GLUCOSE SERPL-MCNC: 85 MG/DL
HCT VFR BLD AUTO: 34.9 %
HGB BLD-MCNC: 11.3 G/DL
LYMPHOCYTES # BLD AUTO: 1.9 K/UL
LYMPHOCYTES NFR BLD: 16.5 %
MCH RBC QN AUTO: 29.6 PG
MCHC RBC AUTO-ENTMCNC: 32.4 G/DL
MCV RBC AUTO: 91 FL
MONOCYTES # BLD AUTO: 0.7 K/UL
MONOCYTES NFR BLD: 5.5 %
NEUTROPHILS # BLD AUTO: 9.2 K/UL
NEUTROPHILS NFR BLD: 77.5 %
PLATELET # BLD AUTO: 371 K/UL
PMV BLD AUTO: 8.8 FL
POTASSIUM SERPL-SCNC: 4.2 MMOL/L
PROT SERPL-MCNC: 7.1 G/DL
RBC # BLD AUTO: 3.82 M/UL
SODIUM SERPL-SCNC: 140 MMOL/L
TROPONIN I SERPL DL<=0.01 NG/ML-MCNC: <0.006 NG/ML
WBC # BLD AUTO: 11.79 K/UL

## 2017-09-13 PROCEDURE — 25000003 PHARM REV CODE 250: Performed by: EMERGENCY MEDICINE

## 2017-09-13 PROCEDURE — 94640 AIRWAY INHALATION TREATMENT: CPT

## 2017-09-13 PROCEDURE — 93010 ELECTROCARDIOGRAM REPORT: CPT | Mod: ,,, | Performed by: INTERNAL MEDICINE

## 2017-09-13 PROCEDURE — 80053 COMPREHEN METABOLIC PANEL: CPT

## 2017-09-13 PROCEDURE — 93005 ELECTROCARDIOGRAM TRACING: CPT

## 2017-09-13 PROCEDURE — 85025 COMPLETE CBC W/AUTO DIFF WBC: CPT

## 2017-09-13 PROCEDURE — 63600175 PHARM REV CODE 636 W HCPCS: Performed by: EMERGENCY MEDICINE

## 2017-09-13 PROCEDURE — 96361 HYDRATE IV INFUSION ADD-ON: CPT

## 2017-09-13 PROCEDURE — 25000242 PHARM REV CODE 250 ALT 637 W/ HCPCS: Performed by: EMERGENCY MEDICINE

## 2017-09-13 PROCEDURE — 99284 EMERGENCY DEPT VISIT MOD MDM: CPT | Mod: 25

## 2017-09-13 PROCEDURE — 84484 ASSAY OF TROPONIN QUANT: CPT

## 2017-09-13 PROCEDURE — 96374 THER/PROPH/DIAG INJ IV PUSH: CPT

## 2017-09-13 RX ORDER — PREDNISONE 20 MG/1
40 TABLET ORAL DAILY
Qty: 10 TABLET | Refills: 0 | Status: SHIPPED | OUTPATIENT
Start: 2017-09-13 | End: 2017-09-18

## 2017-09-13 RX ORDER — GUAIFENESIN/DEXTROMETHORPHAN 100-10MG/5
5 SYRUP ORAL 4 TIMES DAILY PRN
Qty: 120 ML | Refills: 0 | Status: SHIPPED | OUTPATIENT
Start: 2017-09-13 | End: 2017-09-23

## 2017-09-13 RX ORDER — IPRATROPIUM BROMIDE AND ALBUTEROL SULFATE 2.5; .5 MG/3ML; MG/3ML
3 SOLUTION RESPIRATORY (INHALATION)
Status: COMPLETED | OUTPATIENT
Start: 2017-09-13 | End: 2017-09-13

## 2017-09-13 RX ADMIN — IPRATROPIUM BROMIDE AND ALBUTEROL SULFATE 3 ML: 2.5; .5 SOLUTION RESPIRATORY (INHALATION) at 12:09

## 2017-09-13 RX ADMIN — SODIUM CHLORIDE 1000 ML: 0.9 INJECTION, SOLUTION INTRAVENOUS at 12:09

## 2017-09-13 RX ADMIN — METHYLPREDNISOLONE SODIUM SUCCINATE 125 MG: 125 INJECTION, POWDER, FOR SOLUTION INTRAMUSCULAR; INTRAVENOUS at 12:09

## 2017-09-13 NOTE — ED PROVIDER NOTES
SCRIBE #1 NOTE: I, Hernan Ordonez, am scribing for, and in the presence of, Kaur Avery MD. I have scribed the entire note.      History      Chief Complaint   Patient presents with    Shortness of Breath     body aches, fever, cough, diagnosed with pneumonia 9/4, pt states she has finished ABX but symptoms have not improved       Review of patient's allergies indicates:   Allergen Reactions    Benadryl [diphenhydramine hcl]     Penicillins Nausea And Vomiting        HPI   HPI    9/13/2017, 11:47 AM   History obtained from the patient      History of Present Illness: Donna G Severio is a 52 y.o. female patient who presents to the Emergency Department for SOB which onset gradually last week when she was dx with pneumonia on 9/4/17. Symptoms are constant and moderate in severity. Sx are exacerbated by nothing and relieved by nothing. Pt reports Hx of asthma and using her inhaler with little relief. Pt states she has taken all of the prescribed medications to treat her sxs but reports no relief. Associated sxs include fever which is relieved with tylenol, generalized body aches, dizziness, and a productive cough with white sputum. Pt states she is an everyday smoker and reports smoking less than a pack a day. No other sxs reported. Patient denies any chills, N/V/D, abd pain, CP, chest tightness, weakness/numbness, lightheadedness, HA, visual disturbance, palpitations, leg swelling and all other sxs at this time. No further complaints or concerns at this time.     Arrival mode: Personal vehicle     PCP: Chuy Kirkland Jr, MD       Past Medical History:  Past Medical History:   Diagnosis Date    Asthma     Back pain     Hypertension     Thyroid disease     hyperthyroid       Past Surgical History:  Past Surgical History:   Procedure Laterality Date    HYSTERECTOMY      THYROIDECTOMY           Family History:  Family History   Problem Relation Age of Onset    Heart attack Mother 57    Stroke  Father        Social History:  Social History     Social History Main Topics    Smoking status: Current Every Day Smoker     Packs/day: 0.50     Years: 35.00     Types: Cigarettes    Smokeless tobacco: Never Used    Alcohol use No    Drug use:      Types: Marijuana, Benzodiazepines    Sexual activity: Not on file       ROS   Review of Systems   Constitutional: Positive for fever. Negative for chills.        (+)generalized body aches   HENT: Negative for congestion and sore throat.    Respiratory: Positive for cough and shortness of breath. Negative for chest tightness.    Cardiovascular: Negative for chest pain, palpitations and leg swelling.   Gastrointestinal: Negative for abdominal pain, constipation, diarrhea, nausea and vomiting.   Genitourinary: Negative for difficulty urinating and dysuria.   Musculoskeletal: Negative for back pain and neck pain.   Skin: Negative for rash.   Neurological: Positive for dizziness. Negative for weakness, light-headedness, numbness and headaches.   Psychiatric/Behavioral: Negative for agitation and confusion.   All other systems reviewed and are negative.      Physical Exam      Initial Vitals [09/13/17 1144]   BP Pulse Resp Temp SpO2   125/80 85 18 98.6 °F (37 °C) 100 %      MAP       95          Physical Exam  Nursing Notes and Vital Signs Reviewed.  Constitutional: Patient is in no acute distress. Well-developed and well-nourished.  Head: Atraumatic. Normocephalic.  Eyes: PERRL. EOM intact. Conjunctivae are not pale. No scleral icterus.  ENT: Mucous membranes are moist. Oropharynx is clear and symmetric.    Neck: Supple. Full ROM. No lymphadenopathy.  Cardiovascular: Regular rate. Regular rhythm. No murmurs, rubs, or gallops. Distal pulses are 2+ and symmetric.  Pulmonary/Chest: No respiratory distress. Diminished breath sounds noted. Faint expiratory wheezing noted. No rales or rhonchi appreciated.   Abdominal: Soft and non-distended.  There is no tenderness.  No  "rebound, guarding, or rigidity. Good bowel sounds.  Musculoskeletal: Moves all extremities. No obvious deformities. No edema. No calf tenderness.  Skin: Warm and dry.  Neurological:  Alert, awake, and appropriate.  Normal speech.  No acute focal neurological deficits are appreciated.  Psychiatric: Normal affect. Good eye contact. Appropriate in content.    ED Course    Procedures  ED Vital Signs:  Vitals:    09/13/17 1144 09/13/17 1158 09/13/17 1159 09/13/17 1213   BP: 125/80 122/80     Pulse: 85  76 64   Resp: 18  19 20   Temp: 98.6 °F (37 °C)      TempSrc: Oral      SpO2: 100%  99% 99%   Weight: 44 kg (97 lb)      Height: 5' 1" (1.549 m)       09/13/17 1301   BP: (!) 157/91   Pulse: 74   Resp: 20   Temp:    TempSrc:    SpO2: 99%   Weight:    Height:        Abnormal Lab Results:  Labs Reviewed   CBC W/ AUTO DIFFERENTIAL - Abnormal; Notable for the following:        Result Value    RBC 3.82 (*)     Hemoglobin 11.3 (*)     Hematocrit 34.9 (*)     Platelets 371 (*)     MPV 8.8 (*)     Gran # 9.2 (*)     Gran% 77.5 (*)     Lymph% 16.5 (*)     All other components within normal limits   COMPREHENSIVE METABOLIC PANEL - Abnormal; Notable for the following:     Albumin 2.7 (*)     All other components within normal limits   TROPONIN I        All Lab Results:  Results for orders placed or performed during the hospital encounter of 09/13/17   CBC auto differential   Result Value Ref Range    WBC 11.79 3.90 - 12.70 K/uL    RBC 3.82 (L) 4.00 - 5.40 M/uL    Hemoglobin 11.3 (L) 12.0 - 16.0 g/dL    Hematocrit 34.9 (L) 37.0 - 48.5 %    MCV 91 82 - 98 fL    MCH 29.6 27.0 - 31.0 pg    MCHC 32.4 32.0 - 36.0 g/dL    RDW 13.5 11.5 - 14.5 %    Platelets 371 (H) 150 - 350 K/uL    MPV 8.8 (L) 9.2 - 12.9 fL    Gran # 9.2 (H) 1.8 - 7.7 K/uL    Lymph # 1.9 1.0 - 4.8 K/uL    Mono # 0.7 0.3 - 1.0 K/uL    Eos # 0.0 0.0 - 0.5 K/uL    Baso # 0.02 0.00 - 0.20 K/uL    Gran% 77.5 (H) 38.0 - 73.0 %    Lymph% 16.5 (L) 18.0 - 48.0 %    Mono% 5.5 4.0 - " 15.0 %    Eosinophil% 0.3 0.0 - 8.0 %    Basophil% 0.2 0.0 - 1.9 %    Differential Method Automated    Comprehensive metabolic panel   Result Value Ref Range    Sodium 140 136 - 145 mmol/L    Potassium 4.2 3.5 - 5.1 mmol/L    Chloride 102 95 - 110 mmol/L    CO2 29 23 - 29 mmol/L    Glucose 85 70 - 110 mg/dL    BUN, Bld 13 6 - 20 mg/dL    Creatinine 1.0 0.5 - 1.4 mg/dL    Calcium 8.7 8.7 - 10.5 mg/dL    Total Protein 7.1 6.0 - 8.4 g/dL    Albumin 2.7 (L) 3.5 - 5.2 g/dL    Total Bilirubin 0.2 0.1 - 1.0 mg/dL    Alkaline Phosphatase 68 55 - 135 U/L    AST 17 10 - 40 U/L    ALT 10 10 - 44 U/L    Anion Gap 9 8 - 16 mmol/L    eGFR if African American >60 >60 mL/min/1.73 m^2    eGFR if non African American >60 >60 mL/min/1.73 m^2   Troponin I #1   Result Value Ref Range    Troponin I <0.006 0.000 - 0.026 ng/mL         Imaging Results:  Imaging Results          X-Ray Chest PA And Lateral (Final result)  Result time 09/13/17 12:47:09    Final result by Bello Butcher MD (09/13/17 12:47:09)                 Impression:      Persistent right upper lobe perihilar infiltrate or pneumonia.  Continued radiographic followup suggested.  Probable underlying COPD.      Electronically signed by: BELLO BUTCHER MD  Date:     09/13/17  Time:    12:47              Narrative:    Exam: XR CHEST PA AND LATERAL,    Date:  09/13/17 12:33:28    History: Abnormal chest x-ray.  Pneumonia.    Comparison:  02/22/2017 and 09/05/2017.    Findings: Heart size is normal.  There is a persistent right perihilar upper lobe infiltrate most consistent with pneumonia.  Underlying COPD as demonstrated by emphysematous changes of the left lung.                               The EKG was ordered, reviewed, and independently interpreted by the ED provider.  Interpretation time: 1150  Rate: 73 BPM  Rhythm: normal sinus rhythm  Interpretation: Normal EKG. No STEMI.           The Emergency Provider reviewed the vital signs and test results, which are outlined  above.    ED Discussion     1:02 PM: Dr. Kaur Avery discussed the pt's case with Sabrina ([facility], [facility]) who will call back.    1:03 PM: Re-evaluated pt. Pt is resting comfortably and is in no acute distress. D/w pt all pertinent results and plan to discuss her case with [facility] medicine. D/w pt any concerns expressed at this time. Answered all questions. Pt expresses understanding at this time.    1:12 PM: Dr. Kaur Avery discussed the pt's case with Sabrina ([facility], [facility]) who will see the pt in the ED.    1:35 PM: Dr. Kaur Avery discussed the pt's case with Sabrina ,[facility], [facility], who recommends pt can be discharged and she does not recommend prescribing any more abx. [facility] recommends f/u with pulmonology in clinic.     1:36 PM: Reassessed pt at this time.  Pt states her condition has improved at this time. Pt is laying comfortably in ED bed and in NAD. Pt is awake, alert, and oriented. Discussed with pt all pertinent ED information and results. Discussed pt dx and plan of tx. Gave pt all f/u and return to the ED instructions. All questions and concerns were addressed at this time. Pt expresses understanding of information and instructions, and is comfortable with plan to discharge. Pt is stable for discharge.        ED Medication(s):  Medications   albuterol-ipratropium 2.5mg-0.5mg/3mL nebulizer solution 3 mL (3 mLs Nebulization Given 9/13/17 1213)   methylPREDNISolone sod suc(PF) injection 125 mg (125 mg Intravenous Given 9/13/17 1217)   sodium chloride 0.9% bolus 1,000 mL (0 mLs Intravenous Stopped 9/13/17 1330)       Discharge Medication List as of 9/13/2017  1:36 PM      START taking these medications    Details   dextromethorphan-guaifenesin  mg/5 ml (ROBITUSSIN-DM)  mg/5 mL liquid Take 5 mLs by mouth 4 (four) times daily as needed (cough)., Starting Wed 9/13/2017, Until Sat 9/23/2017, Print      predniSONE (DELTASONE) 20 MG tablet Take 2  tablets (40 mg total) by mouth once daily., Starting Wed 9/13/2017, Until Mon 9/18/2017, Print             Follow-up Information     Otis Davison MD. Schedule an appointment as soon as possible for a visit in 2 days.    Specialty:  Pulmonary Disease  Why:  Return to the Emergency Room, If symptoms worsen  Contact information:  [address]  [place] [address]  [phone]                     Medical Decision Making    Medical Decision Making:   Clinical Tests:   Lab Tests: Ordered and Reviewed  Radiological Study: Reviewed and Ordered  Medical Tests: Ordered and Reviewed     Additional MDM:   Smoking Cessation: The patient was counseled on tobacco related  health complications.        Scribe Attestation:   Scribe #1: I performed the above scribed service and the documentation accurately describes the services I performed. I attest to the accuracy of the note.    Attending:   Physician Attestation Statement for Scribe #1: I, Kaur Avery MD, personally performed the services described in this documentation, as scribed by Hernan Ordonez, in my presence, and it is both accurate and complete.          Clinical Impression       ICD-10-CM ICD-9-CM   1. Acute exacerbation of chronic obstructive pulmonary disease (COPD) J44.1 491.21   2. Chronic pneumonia J84.9 515   3. Tobacco abuse disorder Z72.0 305.1       Disposition:   Disposition: Discharged  Condition: Stable         Kaur Avery MD  09/14/17 0643

## 2019-05-06 ENCOUNTER — HOSPITAL ENCOUNTER (EMERGENCY)
Facility: HOSPITAL | Age: 54
Discharge: HOME | End: 2019-05-06
Attending: EMERGENCY MEDICINE
Payer: MEDICAID

## 2019-05-06 VITALS
SYSTOLIC BLOOD PRESSURE: 112 MMHG | HEART RATE: 84 BPM | OXYGEN SATURATION: 100 % | TEMPERATURE: 98 F | WEIGHT: 95.88 LBS | DIASTOLIC BLOOD PRESSURE: 63 MMHG | HEIGHT: 61 IN | BODY MASS INDEX: 18.1 KG/M2 | RESPIRATION RATE: 18 BRPM

## 2019-05-06 DIAGNOSIS — R93.89 ABNORMAL CT OF THE CHEST: Primary | ICD-10-CM

## 2019-05-06 DIAGNOSIS — R07.9 CHEST PAIN: ICD-10-CM

## 2019-05-06 DIAGNOSIS — Z72.0 TOBACCO ABUSE DISORDER: ICD-10-CM

## 2019-05-06 PROCEDURE — 99284 EMERGENCY DEPT VISIT MOD MDM: CPT

## 2019-05-06 NOTE — ED PROVIDER NOTES
"SCRIBE #1 NOTE: I, Zora Doan, am scribing for, and in the presence of, Kaur Avery MD. I have scribed the entire note.         History      Chief Complaint   Patient presents with    Abnormal Ct Scan     Pt states, "I have been having pain in the left side of my chest and then it moved to the right side of my chest and I had a CT about a week ago and my doctor said I have spopts on my lungs and I have to be admitted to the hospital."       Review of patient's allergies indicates:   Allergen Reactions    Penicillins Nausea And Vomiting    Benadryl [diphenhydramine hcl] Palpitations        HPI   HPI    5/6/2019, 3:21 PM   History obtained from the patient      History of Present Illness: Donna G Severio is a 53 y.o. female patient who presents to the Emergency Department for an abnormal chest CT. Pt had a chest CT done at [facility] on 4/26 after she had an abnormal CXR on 4/8/19. Pt had been c/o CP and a cough at that time. Pt reports her PCP referred her to the ED today after the pulmonologist he tried to refer the pt to did not accept her insurance. Pt denies any current CP, SOB, n/v, cough, fever, chills. No further complaints or concerns at this time.       Arrival mode: Personal vehicle      PCP: Chuy Kirkland Jr, MD       Past Medical History:  Past Medical History:   Diagnosis Date    Asthma     Back pain     Hepatitis C     Hypertension     Thyroid disease     hyperthyroid       Past Surgical History:  Past Surgical History:   Procedure Laterality Date    HYSTERECTOMY      THYROIDECTOMY           Family History:  Family History   Problem Relation Age of Onset    Heart attack Mother 57    Stroke Father        Social History:  Social History     Tobacco Use    Smoking status: Current Every Day Smoker     Packs/day: 0.50     Years: 35.00     Pack years: 17.50     Types: Cigarettes    Smokeless tobacco: Never Used   Substance and Sexual Activity    Alcohol use: No    Drug use: Yes "     Types: Marijuana, Benzodiazepines    Sexual activity: unknown       ROS   Review of Systems   Constitutional: Negative for chills, diaphoresis and fever.   HENT: Negative for congestion and sore throat.    Eyes: Negative for visual disturbance.   Respiratory: Negative for cough and shortness of breath.    Cardiovascular: Negative for chest pain, palpitations and leg swelling.   Gastrointestinal: Negative for nausea and vomiting.   Genitourinary: Negative for dysuria.   Musculoskeletal: Negative for back pain and myalgias.   Skin: Negative for rash.   Neurological: Negative for dizziness, weakness and numbness.   Hematological: Does not bruise/bleed easily.   All other systems reviewed and are negative.    Physical Exam      Initial Vitals [05/06/19 1503]   BP Pulse Resp Temp SpO2   120/81 102 20 98.1 °F (36.7 °C) 97 %      MAP       --          Physical Exam  Nursing Notes and Vital Signs Reviewed.  Constitutional: Patient is in no acute distress. Well-developed and well-nourished.  Head: Atraumatic. Normocephalic.  Eyes: PERRL. EOM intact. Conjunctivae are not pale. No scleral icterus.  ENT: Mucous membranes are moist. Oropharynx is clear and symmetric.    Neck: Supple. Full ROM. No lymphadenopathy.  Cardiovascular: Regular rate. Regular rhythm. No murmurs, rubs, or gallops. Distal pulses are 2+ and symmetric.  Pulmonary/Chest: No respiratory distress. Clear to auscultation bilaterally. No wheezing or rales.  Abdominal: Soft and non-distended.  There is no tenderness.  No rebound, guarding, or rigidity.   Musculoskeletal: Moves all extremities. No obvious deformities. No edema. No calf tenderness.  Skin: Warm and dry.  Neurological:  Alert, awake, and appropriate.  Normal speech.  No acute focal neurological deficits are appreciated.  Psychiatric: Normal affect. Good eye contact. Appropriate in content.    ED Course    Procedures  ED Vital Signs:  Vitals:    05/06/19 1503 05/06/19 1530 05/06/19 1635 05/06/19  "1705   BP: 120/81 117/74 126/71 112/63   Pulse: 102 99 80 84   Resp: 20 20 20 18   Temp: 98.1 °F (36.7 °C)   97.9 °F (36.6 °C)   TempSrc: Oral   Oral   SpO2: 97% 99% 98% 100%   Weight: 43.5 kg (95 lb 14.4 oz)      Height: 5' 1" (1.549 m)           EXAM:  CT CHEST W CONTRAST    CLINICAL HISTORY:  Other nonspecific abnormal finding of lung field.  Chest x-ray 4/8/2018.    COMPARISON: Chest x-ray 4/8/2019.    TECHNIQUE:  Axial images. Reformatted coronal and sagittal images.    CONTRAST: Isovue 370 IV contrast.  Volume unspecified.    FINDINGS:    There are mediastinal lymph nodes measuring up to approximately 1 cm in short axis diameter.  There is a right hilar lymph node measuring approximately 9 mm in short axis diameter.    No pleural effusion is identified.    Mild aortic atherosclerosis.    Emphysematous changes.    Right apical pleural-parenchymal thickening.  There is a partially air-filled opacity within the right pulmonary apex measuring 2.5 x 2.1 cm.    There is a partially spiculated opacity within the right upper lobe measuring 2.7 x 1 x 1.9 cm.  This extends from the right hilum and is contiguous with and surrounding some bronchi.  This also involves the right lower lobe with an ill-defined opacity   and bronchial wall thickening and interstitial coarsening.    Within the posterior left upper lobe, there is a partially spiculated mass measuring 4.2 x 2 x 2.3 cm.  There is a contiguous nodule measuring 0.8 cm just anterior to this mass.    Mild degenerative changes within the thoracic spine.        IMPRESSION: Spiculated masses within the right upper lobe and left upper lobe.  Concerning for neoplasm.    Opacity demonstrated within the right lower lobe.  This may be due to infection.  I cannot exclude lymphangitic spread of tumor.    Partially air-filled opacity within the right pulmonary apex.  This may be due to an infectious etiology.    Please correlate above findings " clinically.    COPD.    Mediastinal and right hilar lymph nodes.    Please see above.        Please see above.        All CT scans at [this location] are performed using dose modulation techniques as appropriate to a performed exam including the following: automated exposure control; adjustment of the mA and/or kV according to patient size (this includes techniques or   standardized protocols for targeted exams where dose is matched to indication / reason for exam; i.e. extremities or head); use of iterative reconstruction technique.     Finalized on: 4/26/2019 11:22 AM By:  Valerio Gorman MD  [facility]# [id number]      2019-04-26 11:24:42.165    [facility]   Other Result Information   Interface, Rad Results In - 04/26/2019 11:24 AM CDT  EXAM:  CT CHEST W CONTRAST    CLINICAL HISTORY:  Other nonspecific abnormal finding of lung field.  Chest x-ray 4/8/2018.    COMPARISON: Chest x-ray 4/8/2019.    TECHNIQUE:  Axial images. Reformatted coronal and sagittal images.    CONTRAST: Isovue 370 IV contrast.  Volume unspecified.    FINDINGS:    There are mediastinal lymph nodes measuring up to approximately 1 cm in short axis diameter.  There is a right hilar lymph node measuring approximately 9 mm in short axis diameter.    No pleural effusion is identified.    Mild aortic atherosclerosis.    Emphysematous changes.    Right apical pleural-parenchymal thickening.  There is a partially air-filled opacity within the right pulmonary apex measuring 2.5 x 2.1 cm.    There is a partially spiculated opacity within the right upper lobe measuring 2.7 x 1 x 1.9 cm.  This extends from the right hilum and is contiguous with and surrounding some bronchi.  This also involves the right lower lobe with an ill-defined opacity   and bronchial wall thickening and interstitial coarsening.    Within the posterior left upper lobe, there is a partially spiculated mass measuring 4.2 x 2 x 2.3 cm.  There is a contiguous nodule measuring 0.8 cm just anterior to  this mass.    Mild degenerative changes within the thoracic spine.        IMPRESSION: Spiculated masses within the right upper lobe and left upper lobe.  Concerning for neoplasm.    Opacity demonstrated within the right lower lobe.  This may be due to infection.  I cannot exclude lymphangitic spread of tumor.    Partially air-filled opacity within the right pulmonary apex.  This may be due to an infectious etiology.    Please correlate above findings clinically.    COPD.    Mediastinal and right hilar lymph nodes.    Please see above.        Please see above.        All CT scans at [this location] are performed using dose modulation techniques as appropriate to a performed exam including the following: automated exposure control; adjustment of the mA and/or kV according to patient size (this includes techniques or   standardized protocols for targeted exams where dose is matched to indication / reason for exam; i.e. extremities or head); use of iterative reconstruction technique.     Finalized on: 4/26/2019 11:22 AM By:  Valerio Gorman MD  [facility]# [id number]      2019-04-26 11:24:42.165    [facility]            The Emergency Provider reviewed the vital signs and test results, which are outlined above.    ED Discussion     3:21 PM: Initial assessment of pt.  Pt is awake, alert, and in NAD at this time. Explained that the workup pt requires is done OP. Consult ordered for case management to assist in finding pt OP f/u. Pt is comfortable with plan. Discussed with pt all pertinent ED information. Discussed pt dx and plan of tx. Gave pt all f/u and return to the ED instructions. All questions and concerns were addressed at this time. Pt expresses understanding of information and instructions, and is comfortable with plan to discharge. Pt is stable for discharge.    ED Medication(s):  Medications - No data to display    Discharge Medication List as of 5/6/2019  3:43 PM        Follow-up Information     Chuy Kirkland Jr, MD.  Schedule an appointment as soon as possible for a visit in 2 days.    Specialty:  Internal Medicine  Why:  Return to the Emergency Room, If symptoms worsen  Contact information:  [place] [address]  [phone]             [facility] - Pulmonary and Oncology.    Why:  Referral faxed to [phone] on May 6, 2019. You will receive call to schedule your first appointment.   Contact information:  [address]  [place]                Schedule an appointment as soon as possible for a visit with Eyad Chung MD.    Specialty:  Pulmonary Disease  Why:  Call for pulmonology appointment.  Contact information:  [address] [facility]  [place] [address]  [phone]             Schedule an appointment as soon as possible for a visit with Corona Bui MD.    Specialty:  Hematology and Oncology  Why:  Call for oncology appointment.  Contact information:  [address]  [place] [address]  [phone]                     Medical Decision Making              Scribe Attestation:   Scribe #1: I performed the above scribed service and the documentation accurately describes the services I performed. I attest to the accuracy of the note.    Attending:   Physician Attestation Statement for Scribe #1: I, Kaur Avery MD, personally performed the services described in this documentation, as scribed by Zora Doan, in my presence, and it is both accurate and complete.          Clinical Impression       ICD-10-CM ICD-9-CM   1. Abnormal CT of the chest R93.89 793.2   2. Chest pain R07.9 786.50   3. Tobacco abuse disorder Z72.0 305.1       Disposition:   Disposition: Discharged  Condition: Stable         Kaur Avery MD  05/07/19 1128

## 2019-05-06 NOTE — CONSULTS
Met with patient and explained [facility] referral will be sent for Pulmonology and Oncology. Made copy of patient's records and faxed referrals to [facility] at [phone].

## 2019-05-06 NOTE — ED NOTES
Ayaan with Social Work notified of patient`s need of referral to [facility] for Pulmonology and Oncology consults.

## 2019-05-06 NOTE — ED NOTES
Patient c/o pain to right side of rib cage and headaches x 1 week.     Patient moved to ED room 8, patient assisted onto stretcher and changed into a gown. Patient placed on cardiac monitor, continuous pulse oximetry and automatic blood pressure cuff. Bed placed in low locked position, side rails up x 2, call light is within reach of patient, orientation to room and explanation of wait provided to patient, alarms set and turned on for monitor and pulse ox, awaiting MD evaluation and orders, will continue to monitor.    Patient identifies self as Donna G Severio      LOC: The patient is awake, alert and aware of environment with an appropriate affect, the patient is oriented x 3 and speaking appropriately.  APPEARANCE: Patient resting comfortably and in no acute distress, patient is clean and well groomed, patient's clothing is properly fastened.  SKIN: The skin is warm and dry, color consistent with ethnicity, patient has normal skin turgor and moist mucus membranes, skin intact, no breakdown or bruising noted.  MUSCULOSKELETAL: Pain noted with movement and on palpation to right side of rib cage.   RESPIRATORY: Airway is open and patent, respirations are spontaneous, patient has a normal effort and rate, no accessory muscle use noted.  CARDIAC: Patient has a normal rate and rhythm, no peripheral edema noted, capillary refill < 3 seconds.  ABDOMEN: Soft and non tender to palpation, no distention noted.  NEUROLOGIC: PERRL, 3 mm bilaterally, eyes open spontaneously, behavior appropriate to situation, follows commands, facial expression symmetrical, bilateral hand grasp equal and even, purposeful motor response noted, normal sensation in all extremities when touched with a finger.

## 2021-03-15 ENCOUNTER — OUTSIDE PLACE OF SERVICE (OUTPATIENT)
Dept: ADMINISTRATIVE | Facility: OTHER | Age: 56
End: 2021-03-15
Payer: MEDICAID

## 2021-03-15 PROCEDURE — 99233 SBSQ HOSP IP/OBS HIGH 50: CPT | Mod: ,,, | Performed by: NURSE PRACTITIONER

## 2021-03-15 PROCEDURE — 99233 PR SUBSEQUENT HOSPITAL CARE,LEVL III: ICD-10-PCS | Mod: ,,, | Performed by: NURSE PRACTITIONER

## 2021-03-17 ENCOUNTER — OUTSIDE PLACE OF SERVICE (OUTPATIENT)
Dept: ADMINISTRATIVE | Facility: OTHER | Age: 56
End: 2021-03-17
Payer: MEDICAID

## 2021-03-17 PROCEDURE — 99232 SBSQ HOSP IP/OBS MODERATE 35: CPT | Mod: ,,, | Performed by: THORACIC SURGERY (CARDIOTHORACIC VASCULAR SURGERY)

## 2021-03-17 PROCEDURE — 99232 PR SUBSEQUENT HOSPITAL CARE,LEVL II: ICD-10-PCS | Mod: ,,, | Performed by: THORACIC SURGERY (CARDIOTHORACIC VASCULAR SURGERY)

## 2021-03-19 ENCOUNTER — OUTSIDE PLACE OF SERVICE (OUTPATIENT)
Dept: ADMINISTRATIVE | Facility: OTHER | Age: 56
End: 2021-03-19
Payer: MEDICAID

## 2021-03-19 PROCEDURE — 32666 THORACOSCOPY W/WEDGE RESECT: CPT | Mod: RT,,, | Performed by: THORACIC SURGERY (CARDIOTHORACIC VASCULAR SURGERY)

## 2021-03-19 PROCEDURE — 32666 PR THORACOSCOPY W/WEDGE RESECT: ICD-10-PCS | Mod: RT,,, | Performed by: THORACIC SURGERY (CARDIOTHORACIC VASCULAR SURGERY)

## 2021-03-20 ENCOUNTER — OUTSIDE PLACE OF SERVICE (OUTPATIENT)
Dept: ADMINISTRATIVE | Facility: OTHER | Age: 56
End: 2021-03-20
Payer: MEDICAID

## 2021-03-20 PROCEDURE — 99024 POSTOP FOLLOW-UP VISIT: CPT | Mod: ,,, | Performed by: THORACIC SURGERY (CARDIOTHORACIC VASCULAR SURGERY)

## 2021-03-20 PROCEDURE — 99024 PR POST-OP FOLLOW-UP VISIT: ICD-10-PCS | Mod: ,,, | Performed by: THORACIC SURGERY (CARDIOTHORACIC VASCULAR SURGERY)

## 2021-03-21 ENCOUNTER — OUTSIDE PLACE OF SERVICE (OUTPATIENT)
Dept: ADMINISTRATIVE | Facility: OTHER | Age: 56
End: 2021-03-21
Payer: MEDICAID

## 2021-03-21 PROCEDURE — 99024 POSTOP FOLLOW-UP VISIT: CPT | Mod: ,,, | Performed by: THORACIC SURGERY (CARDIOTHORACIC VASCULAR SURGERY)

## 2021-03-21 PROCEDURE — 99024 PR POST-OP FOLLOW-UP VISIT: ICD-10-PCS | Mod: ,,, | Performed by: THORACIC SURGERY (CARDIOTHORACIC VASCULAR SURGERY)

## 2021-03-22 ENCOUNTER — OUTSIDE PLACE OF SERVICE (OUTPATIENT)
Dept: ADMINISTRATIVE | Facility: OTHER | Age: 56
End: 2021-03-22
Payer: MEDICAID

## 2021-03-22 PROCEDURE — 99024 POSTOP FOLLOW-UP VISIT: CPT | Mod: ,,, | Performed by: THORACIC SURGERY (CARDIOTHORACIC VASCULAR SURGERY)

## 2021-03-22 PROCEDURE — 99024 PR POST-OP FOLLOW-UP VISIT: ICD-10-PCS | Mod: ,,, | Performed by: THORACIC SURGERY (CARDIOTHORACIC VASCULAR SURGERY)

## 2021-03-23 ENCOUNTER — OUTSIDE PLACE OF SERVICE (OUTPATIENT)
Dept: ADMINISTRATIVE | Facility: OTHER | Age: 56
End: 2021-03-23
Payer: MEDICAID

## 2021-03-23 PROCEDURE — 99024 PR POST-OP FOLLOW-UP VISIT: ICD-10-PCS | Mod: ,,, | Performed by: THORACIC SURGERY (CARDIOTHORACIC VASCULAR SURGERY)

## 2021-03-23 PROCEDURE — 99024 POSTOP FOLLOW-UP VISIT: CPT | Mod: ,,, | Performed by: THORACIC SURGERY (CARDIOTHORACIC VASCULAR SURGERY)

## 2021-03-24 ENCOUNTER — OUTSIDE PLACE OF SERVICE (OUTPATIENT)
Dept: ADMINISTRATIVE | Facility: OTHER | Age: 56
End: 2021-03-24
Payer: MEDICAID

## 2021-03-24 PROCEDURE — 99024 PR POST-OP FOLLOW-UP VISIT: ICD-10-PCS | Mod: ,,, | Performed by: THORACIC SURGERY (CARDIOTHORACIC VASCULAR SURGERY)

## 2021-03-24 PROCEDURE — 99024 POSTOP FOLLOW-UP VISIT: CPT | Mod: ,,, | Performed by: THORACIC SURGERY (CARDIOTHORACIC VASCULAR SURGERY)

## 2021-03-25 ENCOUNTER — OUTSIDE PLACE OF SERVICE (OUTPATIENT)
Dept: ADMINISTRATIVE | Facility: OTHER | Age: 56
End: 2021-03-25
Payer: MEDICAID

## 2021-03-25 PROCEDURE — 99024 PR POST-OP FOLLOW-UP VISIT: ICD-10-PCS | Mod: ,,, | Performed by: THORACIC SURGERY (CARDIOTHORACIC VASCULAR SURGERY)

## 2021-03-25 PROCEDURE — 99024 POSTOP FOLLOW-UP VISIT: CPT | Mod: ,,, | Performed by: THORACIC SURGERY (CARDIOTHORACIC VASCULAR SURGERY)

## 2021-04-15 ENCOUNTER — OFFICE VISIT (OUTPATIENT)
Dept: VASCULAR SURGERY | Facility: CLINIC | Age: 56
End: 2021-04-15
Payer: MEDICAID

## 2021-04-15 VITALS
BODY MASS INDEX: 20.82 KG/M2 | SYSTOLIC BLOOD PRESSURE: 121 MMHG | HEIGHT: 61 IN | WEIGHT: 110.25 LBS | HEART RATE: 73 BPM | DIASTOLIC BLOOD PRESSURE: 79 MMHG

## 2021-04-15 DIAGNOSIS — Z90.2 S/P PARTIAL LOBECTOMY OF LUNG: ICD-10-CM

## 2021-04-15 PROCEDURE — 99214 OFFICE O/P EST MOD 30 MIN: CPT | Mod: PBBFAC,PO | Performed by: THORACIC SURGERY (CARDIOTHORACIC VASCULAR SURGERY)

## 2021-04-15 PROCEDURE — 99999 PR PBB SHADOW E&M-EST. PATIENT-LVL IV: CPT | Mod: PBBFAC,,, | Performed by: THORACIC SURGERY (CARDIOTHORACIC VASCULAR SURGERY)

## 2021-04-15 PROCEDURE — 99024 PR POST-OP FOLLOW-UP VISIT: ICD-10-PCS | Mod: ,,, | Performed by: THORACIC SURGERY (CARDIOTHORACIC VASCULAR SURGERY)

## 2021-04-15 PROCEDURE — 99999 PR PBB SHADOW E&M-EST. PATIENT-LVL IV: ICD-10-PCS | Mod: PBBFAC,,, | Performed by: THORACIC SURGERY (CARDIOTHORACIC VASCULAR SURGERY)

## 2021-04-15 PROCEDURE — 99024 POSTOP FOLLOW-UP VISIT: CPT | Mod: ,,, | Performed by: THORACIC SURGERY (CARDIOTHORACIC VASCULAR SURGERY)

## 2021-04-15 RX ORDER — ALBUTEROL SULFATE 90 UG/1
2 AEROSOL, METERED RESPIRATORY (INHALATION) EVERY 6 HOURS PRN
COMMUNITY

## 2021-04-15 RX ORDER — HYDROCODONE BITARTRATE AND ACETAMINOPHEN 7.5; 325 MG/1; MG/1
1 TABLET ORAL EVERY 6 HOURS PRN
COMMUNITY

## 2021-04-15 RX ORDER — POTASSIUM CHLORIDE 750 MG/1
10 TABLET, EXTENDED RELEASE ORAL ONCE
COMMUNITY

## 2021-04-15 RX ORDER — BUTALBITAL, ASPIRIN, AND CAFFEINE 325; 50; 40 MG/1; MG/1; MG/1
1 CAPSULE ORAL EVERY 4 HOURS PRN
COMMUNITY

## 2021-04-15 RX ORDER — LOSARTAN POTASSIUM 50 MG/1
50 TABLET ORAL DAILY
COMMUNITY

## 2021-04-15 RX ORDER — BUDESONIDE AND FORMOTEROL FUMARATE DIHYDRATE 160; 4.5 UG/1; UG/1
2 AEROSOL RESPIRATORY (INHALATION) EVERY 12 HOURS
COMMUNITY

## 2021-04-15 RX ORDER — CLONIDINE 0.1 MG/24H
1 PATCH, EXTENDED RELEASE TRANSDERMAL
COMMUNITY

## 2021-06-17 NOTE — PLAN OF CARE
English Patient dressed, ready for discharge. Anticipate discharge this afternoon with no needs.      06/23/17 0940   Discharge Assessment   Assessment Type Discharge Planning Assessment   Confirmed/corrected address and phone number on facesheet? Yes   Assessment information obtained from? Medical Record   Expected Length of Stay (days) (1)   Communicated expected length of stay with patient/caregiver yes   Prior to hospitilization cognitive status: Alert/Oriented   Prior to hospitalization functional status: Independent   Current cognitive status: Alert/Oriented   Current Functional Status: Independent   Arrived From home or self-care   Lives With significant other   Able to Return to Prior Arrangements yes   Is patient able to care for self after discharge? Yes   Patient's perception of discharge disposition home or selfcare   Readmission Within The Last 30 Days no previous admission in last 30 days   Patient currently being followed by outpatient case management? No   Patient currently receives home health services? No   Does the patient currently use HME? No   Equipment Currently Used at Home none   Do you have any problems affording any of your prescribed medications? No   Is the patient taking medications as prescribed? yes   Do you have any financial concerns preventing you from receiving the healthcare you need? No   Does the patient have transportation to healthcare appointments? Yes   Transportation Available car   On Dialysis? No   Discharge Plan A Home   Patient/Family In Agreement With Plan yes